# Patient Record
Sex: FEMALE | Race: OTHER | HISPANIC OR LATINO
[De-identification: names, ages, dates, MRNs, and addresses within clinical notes are randomized per-mention and may not be internally consistent; named-entity substitution may affect disease eponyms.]

---

## 2017-08-23 ENCOUNTER — RECORD ABSTRACTING (OUTPATIENT)
Age: 37
End: 2017-08-23

## 2017-08-23 DIAGNOSIS — Z92.89 PERSONAL HISTORY OF OTHER MEDICAL TREATMENT: ICD-10-CM

## 2017-08-28 ENCOUNTER — APPOINTMENT (OUTPATIENT)
Dept: ANTEPARTUM | Facility: CLINIC | Age: 37
End: 2017-08-28
Payer: COMMERCIAL

## 2017-08-28 PROCEDURE — 76830 TRANSVAGINAL US NON-OB: CPT

## 2017-08-28 PROCEDURE — 76857 US EXAM PELVIC LIMITED: CPT

## 2018-08-03 ENCOUNTER — APPOINTMENT (OUTPATIENT)
Dept: OBGYN | Facility: CLINIC | Age: 38
End: 2018-08-03

## 2019-07-12 ENCOUNTER — APPOINTMENT (OUTPATIENT)
Dept: OBGYN | Facility: CLINIC | Age: 39
End: 2019-07-12
Payer: COMMERCIAL

## 2019-07-12 VITALS
DIASTOLIC BLOOD PRESSURE: 86 MMHG | WEIGHT: 162 LBS | HEIGHT: 67 IN | BODY MASS INDEX: 25.43 KG/M2 | SYSTOLIC BLOOD PRESSURE: 117 MMHG

## 2019-07-12 DIAGNOSIS — Z78.9 OTHER SPECIFIED HEALTH STATUS: ICD-10-CM

## 2019-07-12 DIAGNOSIS — Z82.49 FAMILY HISTORY OF ISCHEMIC HEART DISEASE AND OTHER DISEASES OF THE CIRCULATORY SYSTEM: ICD-10-CM

## 2019-07-12 DIAGNOSIS — Z85.850 PERSONAL HISTORY OF MALIGNANT NEOPLASM OF THYROID: ICD-10-CM

## 2019-07-12 DIAGNOSIS — Z80.9 FAMILY HISTORY OF MALIGNANT NEOPLASM, UNSPECIFIED: ICD-10-CM

## 2019-07-12 PROCEDURE — 99214 OFFICE O/P EST MOD 30 MIN: CPT

## 2019-07-12 RX ORDER — VITAMIN C, CALCIUM, IRON, VITAMIN D3, VITAMIN E, THIAMIN, RIBOFLAVIN, NIACINAMIDE, VITAMIN B6, FOLIC ACID, IODINE, ZINC, COPPER, DOCUSATE SODIUM 120; 85; 30; 3; 20; 20; 1; 25; 2; 50; 159; 4.54; 150; 5; 400; 3.4 MG/1; MG/1; [IU]/1; MG/1; MG/1; MG/1; MG/1; MG/1; MG/1; MG/1; MG/1; MG/1; UG/1; MG/1; [IU]/1; MG/1
90-1 & 300 TABLET ORAL
Qty: 90 | Refills: 3 | Status: ACTIVE | COMMUNITY
Start: 2019-07-12 | End: 1900-01-01

## 2019-07-12 RX ORDER — ONDANSETRON 4 MG/1
4 TABLET ORAL
Qty: 42 | Refills: 6 | Status: ACTIVE | COMMUNITY
Start: 2019-07-12 | End: 1900-01-01

## 2019-07-12 NOTE — PHYSICAL EXAM
[Awake] : awake [Alert] : alert [Acute Distress] : no acute distress [Mass] : no breast mass [Nipple Discharge] : no nipple discharge [Axillary LAD] : no axillary lymphadenopathy [Soft] : soft [Tender] : non tender [Oriented x3] : oriented to person, place, and time [Normal] : uterus [No Bleeding] : there was no active vaginal bleeding [Polyp ___ cm] : had a [unfilled] ~Ucm polyp [Enlarged ___ wks] : enlarged [unfilled] ~Uweeks [Uterine Adnexae] : were not tender and not enlarged

## 2019-07-15 LAB
C TRACH RRNA SPEC QL NAA+PROBE: NOT DETECTED
HPV HIGH+LOW RISK DNA PNL CVX: NOT DETECTED
N GONORRHOEA RRNA SPEC QL NAA+PROBE: NOT DETECTED
SOURCE TP AMPLIFICATION: NORMAL

## 2019-07-16 LAB — CYTOLOGY CVX/VAG DOC THIN PREP: NORMAL

## 2019-07-22 ENCOUNTER — LABORATORY RESULT (OUTPATIENT)
Age: 39
End: 2019-07-22

## 2019-07-23 ENCOUNTER — NON-APPOINTMENT (OUTPATIENT)
Age: 39
End: 2019-07-23

## 2019-07-23 ENCOUNTER — APPOINTMENT (OUTPATIENT)
Dept: OBGYN | Facility: CLINIC | Age: 39
End: 2019-07-23
Payer: COMMERCIAL

## 2019-07-23 DIAGNOSIS — Z87.01 PERSONAL HISTORY OF PNEUMONIA (RECURRENT): ICD-10-CM

## 2019-07-23 DIAGNOSIS — Z83.3 FAMILY HISTORY OF DIABETES MELLITUS: ICD-10-CM

## 2019-07-23 DIAGNOSIS — Z82.49 FAMILY HISTORY OF ISCHEMIC HEART DISEASE AND OTHER DISEASES OF THE CIRCULATORY SYSTEM: ICD-10-CM

## 2019-07-23 DIAGNOSIS — M51.26 OTHER INTERVERTEBRAL DISC DISPLACEMENT, LUMBAR REGION: ICD-10-CM

## 2019-07-23 PROCEDURE — 0501F PRENATAL FLOW SHEET: CPT

## 2019-07-23 RX ORDER — LEVOTHYROXINE SODIUM 0.17 MG/1
175 TABLET ORAL
Qty: 90 | Refills: 0 | Status: COMPLETED | COMMUNITY
Start: 2019-05-10

## 2019-07-25 LAB
ABO + RH PNL BLD: NORMAL
APPEARANCE: ABNORMAL
BASOPHILS # BLD AUTO: 0.04 K/UL
BASOPHILS NFR BLD AUTO: 0.6 %
BILIRUBIN URINE: NEGATIVE
BLD GP AB SCN SERPL QL: NORMAL
BLOOD URINE: NEGATIVE
CMV IGG SERPL QL: 2.5 U/ML
CMV IGG SERPL-IMP: POSITIVE
COLOR: YELLOW
EOSINOPHIL # BLD AUTO: 0.12 K/UL
EOSINOPHIL NFR BLD AUTO: 1.8 %
ESTIMATED AVERAGE GLUCOSE: 105 MG/DL
GLUCOSE QUALITATIVE U: NEGATIVE
HBA1C MFR BLD HPLC: 5.3 %
HBV SURFACE AG SER QL: NONREACTIVE
HCT VFR BLD CALC: 42.5 %
HCV AB SER QL: NONREACTIVE
HCV S/CO RATIO: 0.14 S/CO
HGB BLD-MCNC: 12.9 G/DL
HIV1+2 AB SPEC QL IA.RAPID: NONREACTIVE
IMM GRANULOCYTES NFR BLD AUTO: 0.1 %
KETONES URINE: NEGATIVE
LEUKOCYTE ESTERASE URINE: NEGATIVE
LYMPHOCYTES # BLD AUTO: 2.07 K/UL
LYMPHOCYTES NFR BLD AUTO: 31 %
M TB IFN-G BLD-IMP: NEGATIVE
MAN DIFF?: NORMAL
MCHC RBC-ENTMCNC: 27.7 PG
MCHC RBC-ENTMCNC: 30.4 GM/DL
MCV RBC AUTO: 91.4 FL
MEV IGG FLD QL IA: <5 AU/ML
MEV IGG+IGM SER-IMP: NEGATIVE
MONOCYTES # BLD AUTO: 0.4 K/UL
MONOCYTES NFR BLD AUTO: 6 %
MUV AB SER-ACNC: POSITIVE
MUV IGG SER QL IA: 118 AU/ML
NEUTROPHILS # BLD AUTO: 4.04 K/UL
NEUTROPHILS NFR BLD AUTO: 60.5 %
NITRITE URINE: NEGATIVE
PH URINE: 7.5
PLATELET # BLD AUTO: 220 K/UL
PROTEIN URINE: ABNORMAL
QUANTIFERON TB PLUS MITOGEN MINUS NIL: 8.68 IU/ML
QUANTIFERON TB PLUS NIL: 0.03 IU/ML
QUANTIFERON TB PLUS TB1 MINUS NIL: -0.01 IU/ML
QUANTIFERON TB PLUS TB2 MINUS NIL: -0.01 IU/ML
RBC # BLD: 4.65 M/UL
RBC # FLD: 15.4 %
RUBV IGG FLD-ACNC: 4.8 INDEX
RUBV IGG SER-IMP: POSITIVE
SPECIFIC GRAVITY URINE: 1.02
T GONDII AB SER-IMP: POSITIVE
T GONDII IGG SER QL: 26.9 IU/ML
T PALLIDUM AB SER QL IA: NEGATIVE
TSH SERPL-ACNC: 45.8 UIU/ML
UROBILINOGEN URINE: NORMAL
VZV AB TITR SER: POSITIVE
VZV IGG SER IF-ACNC: 947.5 INDEX
WBC # FLD AUTO: 6.68 K/UL

## 2019-07-26 LAB
B19V IGG SER QL IA: 4.4 INDEX
B19V IGG+IGM SER-IMP: NORMAL
B19V IGG+IGM SER-IMP: POSITIVE
B19V IGM FLD-ACNC: 0.2
B19V IGM SER-ACNC: NEGATIVE
HGB A MFR BLD: 97.4 %
HGB A2 MFR BLD: 2.6 %
HGB FRACT BLD-IMP: NORMAL

## 2019-08-07 ENCOUNTER — APPOINTMENT (OUTPATIENT)
Dept: MATERNAL FETAL MEDICINE | Facility: CLINIC | Age: 39
End: 2019-08-07
Payer: MEDICAID

## 2019-08-07 ENCOUNTER — ASOB RESULT (OUTPATIENT)
Age: 39
End: 2019-08-07

## 2019-08-07 ENCOUNTER — APPOINTMENT (OUTPATIENT)
Dept: ANTEPARTUM | Facility: CLINIC | Age: 39
End: 2019-08-07
Payer: MEDICAID

## 2019-08-07 PROCEDURE — 76813 OB US NUCHAL MEAS 1 GEST: CPT

## 2019-08-07 PROCEDURE — 99213 OFFICE O/P EST LOW 20 MIN: CPT | Mod: 25

## 2019-08-07 PROCEDURE — 36416 COLLJ CAPILLARY BLOOD SPEC: CPT

## 2019-08-12 LAB
1ST TRIMESTER DATA: NORMAL
ADDENDUM DOC: NORMAL
AFP PNL SERPL: NORMAL
AFP SERPL-ACNC: NORMAL
CLINICAL BIOCHEMIST REVIEW: ABNORMAL
FREE BETA HCG 1ST TRIMESTER: NORMAL
Lab: NORMAL
NASAL BONE: PRESENT
NOTES NTD: NORMAL
NT: NORMAL
PAPP-A SERPL-ACNC: NORMAL
TRISOMY 18/3: NORMAL

## 2019-08-14 ENCOUNTER — NON-APPOINTMENT (OUTPATIENT)
Age: 39
End: 2019-08-14

## 2019-08-14 ENCOUNTER — APPOINTMENT (OUTPATIENT)
Dept: OBGYN | Facility: CLINIC | Age: 39
End: 2019-08-14
Payer: MEDICAID

## 2019-08-14 VITALS
BODY MASS INDEX: 25.74 KG/M2 | DIASTOLIC BLOOD PRESSURE: 69 MMHG | SYSTOLIC BLOOD PRESSURE: 106 MMHG | HEART RATE: 80 BPM | HEIGHT: 67 IN | WEIGHT: 164 LBS

## 2019-08-14 PROCEDURE — 99213 OFFICE O/P EST LOW 20 MIN: CPT

## 2019-08-14 RX ORDER — ONDANSETRON 4 MG/1
4 TABLET ORAL
Qty: 28 | Refills: 2 | Status: ACTIVE | COMMUNITY
Start: 2019-08-14 | End: 1900-01-01

## 2019-08-21 ENCOUNTER — APPOINTMENT (OUTPATIENT)
Dept: ANTEPARTUM | Facility: CLINIC | Age: 39
End: 2019-08-21

## 2019-08-21 ENCOUNTER — APPOINTMENT (OUTPATIENT)
Dept: MATERNAL FETAL MEDICINE | Facility: CLINIC | Age: 39
End: 2019-08-21
Payer: MEDICAID

## 2019-08-21 ENCOUNTER — ASOB RESULT (OUTPATIENT)
Age: 39
End: 2019-08-21

## 2019-08-21 PROCEDURE — 99213 OFFICE O/P EST LOW 20 MIN: CPT

## 2019-08-26 ENCOUNTER — APPOINTMENT (OUTPATIENT)
Dept: ANTEPARTUM | Facility: CLINIC | Age: 39
End: 2019-08-26

## 2019-08-26 LAB
1ST TRIMESTER DATA: NORMAL
2ND TRIMESTER DATA: NORMAL
AFP PNL SERPL: NORMAL
AFP SERPL-ACNC: NORMAL
AFP SERPL-ACNC: NORMAL
B-HCG FREE SERPL-MCNC: NORMAL
CLINICAL BIOCHEMIST REVIEW: ABNORMAL
CLINICAL BIOCHEMIST REVIEW: NORMAL
CLINICAL BIOCHEMIST REVIEW: NORMAL
FREE BETA HCG 1ST TRIMESTER: NORMAL
INHIBIN A SERPL-MCNC: NORMAL
Lab: NORMAL
NASAL BONE: PRESENT
NOTES NTD: NORMAL
NT: NORMAL
PAPP-A SERPL-ACNC: NORMAL
U ESTRIOL SERPL-SCNC: NORMAL

## 2019-09-03 ENCOUNTER — APPOINTMENT (OUTPATIENT)
Dept: OBGYN | Facility: CLINIC | Age: 39
End: 2019-09-03
Payer: MEDICAID

## 2019-09-03 ENCOUNTER — NON-APPOINTMENT (OUTPATIENT)
Age: 39
End: 2019-09-03

## 2019-09-03 VITALS
BODY MASS INDEX: 26.53 KG/M2 | WEIGHT: 169.06 LBS | HEART RATE: 80 BPM | SYSTOLIC BLOOD PRESSURE: 113 MMHG | DIASTOLIC BLOOD PRESSURE: 75 MMHG | HEIGHT: 67 IN

## 2019-09-03 PROCEDURE — 0502F SUBSEQUENT PRENATAL CARE: CPT | Mod: NC

## 2019-09-03 RX ORDER — PRENATAL VIT NO.130/IRON/FOLIC 27MG-0.8MG
28-0.8 TABLET ORAL DAILY
Qty: 90 | Refills: 2 | Status: ACTIVE | COMMUNITY
Start: 2019-09-03 | End: 1900-01-01

## 2019-09-10 ENCOUNTER — APPOINTMENT (OUTPATIENT)
Dept: ANTEPARTUM | Facility: CLINIC | Age: 39
End: 2019-09-10

## 2019-09-16 LAB — FMR1 GENE MUT ANL BLD/T: NORMAL

## 2019-09-19 LAB
AR GENE MUT ANL BLD/T: NEGATIVE
CFTR MUT TESTED BLD/T: NEGATIVE

## 2019-09-23 ENCOUNTER — NON-APPOINTMENT (OUTPATIENT)
Age: 39
End: 2019-09-23

## 2019-09-23 ENCOUNTER — APPOINTMENT (OUTPATIENT)
Dept: OBGYN | Facility: CLINIC | Age: 39
End: 2019-09-23
Payer: MEDICAID

## 2019-09-23 ENCOUNTER — APPOINTMENT (OUTPATIENT)
Dept: ANTEPARTUM | Facility: CLINIC | Age: 39
End: 2019-09-23
Payer: MEDICAID

## 2019-09-23 ENCOUNTER — ASOB RESULT (OUTPATIENT)
Age: 39
End: 2019-09-23

## 2019-09-23 VITALS
SYSTOLIC BLOOD PRESSURE: 107 MMHG | DIASTOLIC BLOOD PRESSURE: 69 MMHG | BODY MASS INDEX: 26.45 KG/M2 | HEIGHT: 67 IN | WEIGHT: 168.5 LBS

## 2019-09-23 PROCEDURE — 0502F SUBSEQUENT PRENATAL CARE: CPT | Mod: NC

## 2019-09-23 PROCEDURE — 76811 OB US DETAILED SNGL FETUS: CPT

## 2019-09-23 PROCEDURE — 76817 TRANSVAGINAL US OBSTETRIC: CPT

## 2019-10-02 ENCOUNTER — APPOINTMENT (OUTPATIENT)
Dept: ANTEPARTUM | Facility: CLINIC | Age: 39
End: 2019-10-02

## 2019-10-16 ENCOUNTER — APPOINTMENT (OUTPATIENT)
Dept: OBGYN | Facility: CLINIC | Age: 39
End: 2019-10-16
Payer: MEDICAID

## 2019-10-16 ENCOUNTER — NON-APPOINTMENT (OUTPATIENT)
Age: 39
End: 2019-10-16

## 2019-10-16 VITALS
SYSTOLIC BLOOD PRESSURE: 117 MMHG | HEART RATE: 90 BPM | BODY MASS INDEX: 27.47 KG/M2 | DIASTOLIC BLOOD PRESSURE: 73 MMHG | HEIGHT: 67 IN | WEIGHT: 175 LBS

## 2019-10-16 PROCEDURE — 0502F SUBSEQUENT PRENATAL CARE: CPT

## 2019-11-11 ENCOUNTER — NON-APPOINTMENT (OUTPATIENT)
Age: 39
End: 2019-11-11

## 2019-11-11 ENCOUNTER — APPOINTMENT (OUTPATIENT)
Dept: OBGYN | Facility: CLINIC | Age: 39
End: 2019-11-11
Payer: MEDICAID

## 2019-11-11 VITALS
DIASTOLIC BLOOD PRESSURE: 82 MMHG | BODY MASS INDEX: 27.15 KG/M2 | WEIGHT: 173 LBS | HEIGHT: 67 IN | HEART RATE: 102 BPM | SYSTOLIC BLOOD PRESSURE: 121 MMHG

## 2019-11-11 PROCEDURE — 0502F SUBSEQUENT PRENATAL CARE: CPT

## 2019-11-12 LAB
BILIRUB UR QL STRIP: NORMAL
GLUCOSE UR-MCNC: 250
HCG UR QL: 0.2 EU/DL
HGB UR QL STRIP.AUTO: NORMAL
KETONES UR-MCNC: NORMAL
LEUKOCYTE ESTERASE UR QL STRIP: NORMAL
NITRITE UR QL STRIP: NORMAL
PH UR STRIP: 5.5
PROT UR STRIP-MCNC: 30
SP GR UR STRIP: 1.03

## 2019-11-18 ENCOUNTER — LABORATORY RESULT (OUTPATIENT)
Age: 39
End: 2019-11-18

## 2019-11-18 ENCOUNTER — APPOINTMENT (OUTPATIENT)
Dept: OBGYN | Facility: CLINIC | Age: 39
End: 2019-11-18
Payer: MEDICAID

## 2019-11-18 PROCEDURE — 36415 COLL VENOUS BLD VENIPUNCTURE: CPT

## 2019-11-19 LAB
BASOPHILS # BLD AUTO: 0.03 K/UL
BASOPHILS NFR BLD AUTO: 0.3 %
EOSINOPHIL # BLD AUTO: 0.08 K/UL
EOSINOPHIL NFR BLD AUTO: 0.8 %
HCT VFR BLD CALC: 36.9 %
HGB BLD-MCNC: 11.5 G/DL
IMM GRANULOCYTES NFR BLD AUTO: 0.4 %
LYMPHOCYTES # BLD AUTO: 1.88 K/UL
LYMPHOCYTES NFR BLD AUTO: 19.9 %
MAN DIFF?: NORMAL
MCHC RBC-ENTMCNC: 27.3 PG
MCHC RBC-ENTMCNC: 31.2 GM/DL
MCV RBC AUTO: 87.6 FL
MONOCYTES # BLD AUTO: 0.56 K/UL
MONOCYTES NFR BLD AUTO: 5.9 %
NEUTROPHILS # BLD AUTO: 6.87 K/UL
NEUTROPHILS NFR BLD AUTO: 72.7 %
PLATELET # BLD AUTO: 246 K/UL
RBC # BLD: 4.21 M/UL
RBC # FLD: 14 %
WBC # FLD AUTO: 9.46 K/UL

## 2019-11-25 ENCOUNTER — ASOB RESULT (OUTPATIENT)
Age: 39
End: 2019-11-25

## 2019-11-25 ENCOUNTER — APPOINTMENT (OUTPATIENT)
Dept: ANTEPARTUM | Facility: CLINIC | Age: 39
End: 2019-11-25
Payer: MEDICAID

## 2019-11-25 PROCEDURE — 76816 OB US FOLLOW-UP PER FETUS: CPT

## 2019-11-26 ENCOUNTER — RESULT REVIEW (OUTPATIENT)
Age: 39
End: 2019-11-26

## 2019-11-26 DIAGNOSIS — N91.1 SECONDARY AMENORRHEA: ICD-10-CM

## 2019-12-03 ENCOUNTER — NON-APPOINTMENT (OUTPATIENT)
Age: 39
End: 2019-12-03

## 2019-12-03 ENCOUNTER — APPOINTMENT (OUTPATIENT)
Dept: OBGYN | Facility: CLINIC | Age: 39
End: 2019-12-03
Payer: MEDICAID

## 2019-12-03 VITALS
HEIGHT: 67 IN | SYSTOLIC BLOOD PRESSURE: 115 MMHG | WEIGHT: 178.31 LBS | HEART RATE: 105 BPM | BODY MASS INDEX: 27.99 KG/M2 | DIASTOLIC BLOOD PRESSURE: 72 MMHG

## 2019-12-03 PROCEDURE — 0502F SUBSEQUENT PRENATAL CARE: CPT

## 2019-12-05 LAB
GLUCOSE 1H P 100 G GLC PO SERPL-MCNC: 183 MG/DL
GLUCOSE 1H P 50 G GLC PO SERPL-MCNC: 146 MG/DL
GLUCOSE 2H P CHAL SERPL-MCNC: 149 MG/DL
GLUCOSE 3H P CHAL SERPL-MCNC: 146 MG/DL
GLUCOSE BS SERPL-MCNC: 80 MG/DL

## 2019-12-13 ENCOUNTER — APPOINTMENT (OUTPATIENT)
Dept: MATERNAL FETAL MEDICINE | Facility: CLINIC | Age: 39
End: 2019-12-13
Payer: MEDICAID

## 2019-12-13 ENCOUNTER — ASOB RESULT (OUTPATIENT)
Age: 39
End: 2019-12-13

## 2019-12-13 VITALS — BODY MASS INDEX: 28.18 KG/M2 | HEIGHT: 67 IN | WEIGHT: 179.56 LBS

## 2019-12-13 PROCEDURE — XXXXX: CPT

## 2019-12-13 RX ORDER — BLOOD SUGAR DIAGNOSTIC
STRIP MISCELLANEOUS
Qty: 3 | Refills: 3 | Status: DISCONTINUED | COMMUNITY
Start: 2019-12-13 | End: 2019-12-13

## 2019-12-13 RX ORDER — BLOOD-GLUCOSE METER
W/DEVICE KIT MISCELLANEOUS
Qty: 1 | Refills: 0 | Status: ACTIVE | COMMUNITY
Start: 2019-12-13 | End: 1900-01-01

## 2019-12-13 RX ORDER — LANCETS 28 GAUGE
EACH MISCELLANEOUS
Qty: 1 | Refills: 3 | Status: ACTIVE | COMMUNITY
Start: 2019-12-13 | End: 1900-01-01

## 2019-12-13 RX ORDER — LANCETS
EACH MISCELLANEOUS
Qty: 1 | Refills: 3 | Status: DISCONTINUED | COMMUNITY
Start: 2019-12-13 | End: 2019-12-13

## 2019-12-13 RX ORDER — BLOOD SUGAR DIAGNOSTIC
STRIP MISCELLANEOUS 4 TIMES DAILY
Qty: 3 | Refills: 3 | Status: ACTIVE | COMMUNITY
Start: 2019-12-13 | End: 1900-01-01

## 2019-12-16 LAB
ESTIMATED AVERAGE GLUCOSE: 114 MG/DL
HBA1C MFR BLD HPLC: 5.6 %

## 2019-12-17 ENCOUNTER — APPOINTMENT (OUTPATIENT)
Dept: OBGYN | Facility: CLINIC | Age: 39
End: 2019-12-17
Payer: MEDICAID

## 2019-12-17 ENCOUNTER — NON-APPOINTMENT (OUTPATIENT)
Age: 39
End: 2019-12-17

## 2019-12-17 VITALS
DIASTOLIC BLOOD PRESSURE: 75 MMHG | SYSTOLIC BLOOD PRESSURE: 113 MMHG | BODY MASS INDEX: 27.61 KG/M2 | WEIGHT: 176.31 LBS | HEART RATE: 86 BPM

## 2019-12-17 PROCEDURE — 90715 TDAP VACCINE 7 YRS/> IM: CPT

## 2019-12-17 PROCEDURE — 0502F SUBSEQUENT PRENATAL CARE: CPT

## 2019-12-17 PROCEDURE — 90471 IMMUNIZATION ADMIN: CPT

## 2019-12-23 ENCOUNTER — APPOINTMENT (OUTPATIENT)
Dept: ANTEPARTUM | Facility: CLINIC | Age: 39
End: 2019-12-23
Payer: MEDICAID

## 2019-12-23 ENCOUNTER — ASOB RESULT (OUTPATIENT)
Age: 39
End: 2019-12-23

## 2019-12-23 PROCEDURE — 76819 FETAL BIOPHYS PROFIL W/O NST: CPT

## 2019-12-23 PROCEDURE — 76816 OB US FOLLOW-UP PER FETUS: CPT

## 2019-12-27 ENCOUNTER — APPOINTMENT (OUTPATIENT)
Dept: ANTEPARTUM | Facility: CLINIC | Age: 39
End: 2019-12-27

## 2019-12-27 ENCOUNTER — APPOINTMENT (OUTPATIENT)
Dept: MATERNAL FETAL MEDICINE | Facility: CLINIC | Age: 39
End: 2019-12-27
Payer: MEDICAID

## 2019-12-27 VITALS
OXYGEN SATURATION: 98 % | DIASTOLIC BLOOD PRESSURE: 70 MMHG | WEIGHT: 182.56 LBS | SYSTOLIC BLOOD PRESSURE: 115 MMHG | BODY MASS INDEX: 28.65 KG/M2 | HEIGHT: 67 IN | HEART RATE: 98 BPM | RESPIRATION RATE: 18 BRPM

## 2019-12-27 DIAGNOSIS — O09.513 SUPERVISION OF ELDERLY PRIMIGRAVIDA, THIRD TRIMESTER: ICD-10-CM

## 2019-12-27 PROCEDURE — 99214 OFFICE O/P EST MOD 30 MIN: CPT

## 2019-12-27 RX ORDER — NITROFURANTOIN (MONOHYDRATE/MACROCRYSTALS) 25; 75 MG/1; MG/1
100 CAPSULE ORAL
Qty: 14 | Refills: 0 | Status: DISCONTINUED | COMMUNITY
Start: 2019-11-21

## 2019-12-27 RX ORDER — TERCONAZOLE 4 MG/G
0.4 CREAM VAGINAL DAILY
Qty: 1 | Refills: 3 | Status: DISCONTINUED | COMMUNITY
Start: 2019-12-17 | End: 2019-12-27

## 2019-12-27 RX ORDER — BLOOD-GLUCOSE METER
KIT MISCELLANEOUS
Qty: 1 | Refills: 0 | Status: ACTIVE | COMMUNITY
Start: 2019-12-13

## 2019-12-27 RX ORDER — LEVOTHYROXINE SODIUM 0.12 MG/1
125 TABLET ORAL
Qty: 60 | Refills: 0 | Status: ACTIVE | COMMUNITY
Start: 2019-12-11

## 2019-12-27 RX ORDER — LEVOTHYROXINE SODIUM 0.2 MG/1
200 TABLET ORAL
Refills: 0 | Status: DISCONTINUED | COMMUNITY
End: 2019-12-27

## 2019-12-27 RX ORDER — POLYETHYLENE GLYCOL 3350 17 G/17G
17 POWDER, FOR SOLUTION ORAL
Qty: 510 | Refills: 0 | Status: DISCONTINUED | COMMUNITY
Start: 2019-11-30

## 2019-12-27 NOTE — ACTIVE PROBLEMS
[Heart Disease] : no heart disease [Hypertension] : no hypertension [Neurologic Disorder] : no neurologic disorder, no epilepsy [Renal Disease] : no kidney disease, no UTI [Autoimmune Disease] : no autoimmune disease [Hepatic Disorder] : no hepatitis, no liver disease [Psychiatric Disorders] : no psychiatric disorders [Depression] : no depression, no post partum depression [Trauma] : no trauma/violence [Thrombophlebitis] : no varicosities, no phlebitis [Blood Transfusion (___ Ml)] : no history of blood transfusion

## 2019-12-27 NOTE — PAST MEDICAL HISTORY
[HIV Infection] : no HIV [Exposure To Gonorrhea] : no gonorrhea [Chlamydial Infections] : no chlamydia [Syphilis] : no syphilis [Herpes Simplex] : no genital herpes [Hepatitis, B Virus] : no Hepatitis B [Human Papilloma Virus Infection] : no genital warts [Trichomoniasis] : no trichomoniasis [Hepatitis, C Virus] : no Hepatitis C

## 2019-12-27 NOTE — VITALS
[US Date: ___] : ultrasound performed on [unfilled]. [GA= ___ Days] : and [unfilled] day(s) [GA= ___ Weeks] : Results were GA of [unfilled] weeks [By US] : this is the final DIANNE [DIANNE by US (date): ___] : The calculated DIANNE by US is [unfilled]

## 2019-12-27 NOTE — DISCUSSION/SUMMARY
[FreeTextEntry1] : She is 33 weeks and 6 days gestation by early ultrasound dating.\par \par Regarding her gestational diabetes, a Hemoglobin A1c done on 2019 was 5.6%.  She states that she has been trying to follow a diabetic diet. She is not able to eat 3 meals and 3 snacks. 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 She eats a snack at 4 AM at work. She checks her glucose when she gets home from work and 2 other times during the day. None of the glucose readings are fasting or 1 hour after meals. She is not keeping a food diary. She is not dating her glucose entries. She was advised to start keeping a food diary and dating her glucose entries. My review of her log book revealed 2 glucose readings above 140 (145 &151). At this time it is difficult to asses her glycemic control. I informed her that maintaining euglycemia is the most important factor associated with good  outcomes in pregnancies complicated by gestational diabetes. I told her that poor glucose control may cause fetal macrosomia, shoulder dystocia,  delivery,  respiratory distress syndrome and  metabolic complications such as hypoglycemia and hyperbilirubinemia.  I told her that she is at risk for developing gestational hypertension and preeclampsia during the current pregnancy. I also told her that she is at risk for developing type 2 diabetes, metabolic syndrome and cardiovascular disease later in life.  I also recommend a 75 gram 2 hour OGTT approximately 6 - 8 weeks postpartum to determine whether she has impaired glucose tolerance or preexisting diabetes not diagnosed prior to the pregnancy. I believe that she needs more nutritional counseling. I made arrangements for her to see our diabetes educator on 1/3/20. \par \par She had a prior  section delivery in [place]. The type of uterine incision is unknown. . She was informed of the risks and benefits of a trial of labor. She was informed of the risk of uterine rupture and the associated maternal complications such as hysterectomy, blood transfusions, and intensive care unit admission. She was also informed of the associated  adverse outcomes in cases of uterine rupture such as stillbirth, fetal hypoxia and neurological impairment (cerebral palsy). She was advised to have a repeat  delivery due to unknown uterine scar.\par \par

## 2019-12-27 NOTE — FAMILY HISTORY
[Roberto-Sachs Carrier] : no Roberto-Sachs [Reported Family History Of Birth Defects] : no congenital heart defects [Family History] : no mental retardation/autism [Reported Family History Of Genetic Disease] : no history of child defect in child of baby father

## 2019-12-27 NOTE — SURGICAL HISTORY
[Infertility] : infertility [Last Pap: ___] : Last Pap: [unfilled] [Fibroids] : no fibroids [Abn Paps] : no abnormal pap smears [Breast Disease] : no breast disease [STI's] : no STI's [Cysts] : no cysts [OC Use] : no OC use

## 2019-12-27 NOTE — OB HISTORY
[Pregnancy History] : patient received anesthesia [Spontaneous] : Spontaneous conception [at ___ wks] : at [unfilled] weeks [Sonogram] : sonogram [Approximately ___ (Month)] : the LMP was approximately [unfilled] month(s) ago [___] : Delivery occurred at [unfilled] [FreeTextEntry1] : A three-hour GTT done on December 4, 2019 reported a fasting glucose of 80, one-hour glucose of 183, two-hour glucose of 149, and three-hour glucose of 146. There were 2 elevated glucose values consistent with the diagnosis of gestational diabetes.\par \par She was seen by our diabetes educator on December 13, 2019 for initial diabetes education and counseling. \par \par  [Normal Amount/Duration] : was abnormal [Spotting Between  Menses] : no spotting between menses [Regular Cycle Intervals] : periods have been irregular

## 2020-01-03 ENCOUNTER — APPOINTMENT (OUTPATIENT)
Dept: MATERNAL FETAL MEDICINE | Facility: CLINIC | Age: 40
End: 2020-01-03
Payer: MEDICAID

## 2020-01-03 ENCOUNTER — APPOINTMENT (OUTPATIENT)
Dept: OBGYN | Facility: CLINIC | Age: 40
End: 2020-01-03
Payer: MEDICAID

## 2020-01-03 ENCOUNTER — NON-APPOINTMENT (OUTPATIENT)
Age: 40
End: 2020-01-03

## 2020-01-03 ENCOUNTER — ASOB RESULT (OUTPATIENT)
Age: 40
End: 2020-01-03

## 2020-01-03 VITALS
HEART RATE: 81 BPM | SYSTOLIC BLOOD PRESSURE: 108 MMHG | DIASTOLIC BLOOD PRESSURE: 73 MMHG | WEIGHT: 181.31 LBS | BODY MASS INDEX: 28.4 KG/M2

## 2020-01-03 VITALS — HEIGHT: 67 IN | WEIGHT: 182 LBS | BODY MASS INDEX: 28.56 KG/M2

## 2020-01-03 PROCEDURE — G0108 DIAB MANAGE TRN  PER INDIV: CPT

## 2020-01-03 PROCEDURE — 0502F SUBSEQUENT PRENATAL CARE: CPT

## 2020-01-06 DIAGNOSIS — Z3A.34 34 WEEKS GESTATION OF PREGNANCY: ICD-10-CM

## 2020-01-06 DIAGNOSIS — Z3A.33 33 WEEKS GESTATION OF PREGNANCY: ICD-10-CM

## 2020-01-16 ENCOUNTER — NON-APPOINTMENT (OUTPATIENT)
Age: 40
End: 2020-01-16

## 2020-01-16 ENCOUNTER — APPOINTMENT (OUTPATIENT)
Dept: OBGYN | Facility: CLINIC | Age: 40
End: 2020-01-16
Payer: MEDICAID

## 2020-01-16 VITALS
BODY MASS INDEX: 28.74 KG/M2 | SYSTOLIC BLOOD PRESSURE: 125 MMHG | WEIGHT: 183.5 LBS | DIASTOLIC BLOOD PRESSURE: 81 MMHG | HEART RATE: 88 BPM

## 2020-01-16 DIAGNOSIS — N60.19 DIFFUSE CYSTIC MASTOPATHY OF UNSPECIFIED BREAST: ICD-10-CM

## 2020-01-16 PROCEDURE — 0502F SUBSEQUENT PRENATAL CARE: CPT

## 2020-01-16 RX ORDER — TERCONAZOLE 4 MG/G
0.4 CREAM VAGINAL DAILY
Qty: 1 | Refills: 3 | Status: ACTIVE | COMMUNITY
Start: 2020-01-16 | End: 1900-01-01

## 2020-01-17 LAB — HIV1+2 AB SPEC QL IA.RAPID: NONREACTIVE

## 2020-01-20 ENCOUNTER — ASOB RESULT (OUTPATIENT)
Age: 40
End: 2020-01-20

## 2020-01-20 ENCOUNTER — APPOINTMENT (OUTPATIENT)
Dept: ANTEPARTUM | Facility: CLINIC | Age: 40
End: 2020-01-20
Payer: MEDICAID

## 2020-01-20 ENCOUNTER — OUTPATIENT (OUTPATIENT)
Dept: OUTPATIENT SERVICES | Facility: HOSPITAL | Age: 40
LOS: 1 days | End: 2020-01-20
Payer: MEDICAID

## 2020-01-20 VITALS
HEART RATE: 76 BPM | TEMPERATURE: 98 F | SYSTOLIC BLOOD PRESSURE: 98 MMHG | WEIGHT: 163.14 LBS | HEIGHT: 67 IN | RESPIRATION RATE: 20 BRPM | DIASTOLIC BLOOD PRESSURE: 68 MMHG

## 2020-01-20 DIAGNOSIS — Z98.89 OTHER SPECIFIED POSTPROCEDURAL STATES: Chronic | ICD-10-CM

## 2020-01-20 DIAGNOSIS — E89.0 POSTPROCEDURAL HYPOTHYROIDISM: Chronic | ICD-10-CM

## 2020-01-20 DIAGNOSIS — Z01.818 ENCOUNTER FOR OTHER PREPROCEDURAL EXAMINATION: ICD-10-CM

## 2020-01-20 LAB
ANION GAP SERPL CALC-SCNC: 12 MMOL/L — SIGNIFICANT CHANGE UP (ref 5–17)
APPEARANCE UR: ABNORMAL
BACTERIA # UR AUTO: ABNORMAL
BASOPHILS # BLD AUTO: 0.03 K/UL — SIGNIFICANT CHANGE UP (ref 0–0.2)
BASOPHILS NFR BLD AUTO: 0.4 % — SIGNIFICANT CHANGE UP (ref 0–2)
BILIRUB UR-MCNC: NEGATIVE — SIGNIFICANT CHANGE UP
BLD GP AB SCN SERPL QL: SIGNIFICANT CHANGE UP
BUN SERPL-MCNC: 13 MG/DL — SIGNIFICANT CHANGE UP (ref 8–20)
CALCIUM SERPL-MCNC: 9.3 MG/DL — SIGNIFICANT CHANGE UP (ref 8.6–10.2)
CHLORIDE SERPL-SCNC: 106 MMOL/L — SIGNIFICANT CHANGE UP (ref 98–107)
CO2 SERPL-SCNC: 22 MMOL/L — SIGNIFICANT CHANGE UP (ref 22–29)
COLOR SPEC: YELLOW — SIGNIFICANT CHANGE UP
CREAT SERPL-MCNC: 0.52 MG/DL — SIGNIFICANT CHANGE UP (ref 0.5–1.3)
DIFF PNL FLD: ABNORMAL
EOSINOPHIL # BLD AUTO: 0.06 K/UL — SIGNIFICANT CHANGE UP (ref 0–0.5)
EOSINOPHIL NFR BLD AUTO: 0.8 % — SIGNIFICANT CHANGE UP (ref 0–6)
EPI CELLS # UR: ABNORMAL
GLUCOSE SERPL-MCNC: 71 MG/DL — SIGNIFICANT CHANGE UP (ref 70–115)
GLUCOSE UR QL: NEGATIVE MG/DL — SIGNIFICANT CHANGE UP
GP B STREP DNA SPEC QL NAA+PROBE: NORMAL
GP B STREP DNA SPEC QL NAA+PROBE: NOT DETECTED
HCT VFR BLD CALC: 38.8 % — SIGNIFICANT CHANGE UP (ref 34.5–45)
HGB BLD-MCNC: 11.8 G/DL — SIGNIFICANT CHANGE UP (ref 11.5–15.5)
HIV 1 & 2 AB SERPL IA.RAPID: SIGNIFICANT CHANGE UP
IMM GRANULOCYTES NFR BLD AUTO: 0.3 % — SIGNIFICANT CHANGE UP (ref 0–1.5)
KETONES UR-MCNC: NEGATIVE — SIGNIFICANT CHANGE UP
LEUKOCYTE ESTERASE UR-ACNC: ABNORMAL
LYMPHOCYTES # BLD AUTO: 1.59 K/UL — SIGNIFICANT CHANGE UP (ref 1–3.3)
LYMPHOCYTES # BLD AUTO: 20.8 % — SIGNIFICANT CHANGE UP (ref 13–44)
MCHC RBC-ENTMCNC: 25.1 PG — LOW (ref 27–34)
MCHC RBC-ENTMCNC: 30.4 GM/DL — LOW (ref 32–36)
MCV RBC AUTO: 82.6 FL — SIGNIFICANT CHANGE UP (ref 80–100)
MONOCYTES # BLD AUTO: 0.46 K/UL — SIGNIFICANT CHANGE UP (ref 0–0.9)
MONOCYTES NFR BLD AUTO: 6 % — SIGNIFICANT CHANGE UP (ref 2–14)
NEUTROPHILS # BLD AUTO: 5.48 K/UL — SIGNIFICANT CHANGE UP (ref 1.8–7.4)
NEUTROPHILS NFR BLD AUTO: 71.7 % — SIGNIFICANT CHANGE UP (ref 43–77)
NITRITE UR-MCNC: NEGATIVE — SIGNIFICANT CHANGE UP
PH UR: 7 — SIGNIFICANT CHANGE UP (ref 5–8)
PLATELET # BLD AUTO: 200 K/UL — SIGNIFICANT CHANGE UP (ref 150–400)
POTASSIUM SERPL-MCNC: 4.5 MMOL/L — SIGNIFICANT CHANGE UP (ref 3.5–5.3)
POTASSIUM SERPL-SCNC: 4.5 MMOL/L — SIGNIFICANT CHANGE UP (ref 3.5–5.3)
PROT UR-MCNC: 15 MG/DL
RBC # BLD: 4.7 M/UL — SIGNIFICANT CHANGE UP (ref 3.8–5.2)
RBC # FLD: 16.6 % — HIGH (ref 10.3–14.5)
RBC CASTS # UR COMP ASSIST: ABNORMAL /HPF (ref 0–4)
SODIUM SERPL-SCNC: 140 MMOL/L — SIGNIFICANT CHANGE UP (ref 135–145)
SOURCE GBS: NORMAL
SP GR SPEC: 1 — LOW (ref 1.01–1.02)
UROBILINOGEN FLD QL: NEGATIVE MG/DL — SIGNIFICANT CHANGE UP
WBC # BLD: 7.64 K/UL — SIGNIFICANT CHANGE UP (ref 3.8–10.5)
WBC # FLD AUTO: 7.64 K/UL — SIGNIFICANT CHANGE UP (ref 3.8–10.5)
WBC UR QL: >50

## 2020-01-20 PROCEDURE — T1013: CPT

## 2020-01-20 PROCEDURE — 76816 OB US FOLLOW-UP PER FETUS: CPT

## 2020-01-20 PROCEDURE — 76819 FETAL BIOPHYS PROFIL W/O NST: CPT

## 2020-01-20 NOTE — OB PST NOTE - INFANT HOME WITH MOTHER, OB PROFILE
Pt requesting to schedule MFM 1st trimester screening. Referral was not placed at office visit on 5/29. Placed referral and informed pt can call MFM to schedule. Pt also looking for procedure codes for testing, advised she check with MFM for these codes.       yes

## 2020-01-20 NOTE — OB PST NOTE - PMH
Gestational diabetes mellitus (GDM), antepartum, gestational diabetes method of control unspecified    Hemorrhoids, complicated    Hypothyroid

## 2020-01-21 LAB — HIV 1+2 AB+HIV1 P24 AG SERPL QL IA: SIGNIFICANT CHANGE UP

## 2020-01-23 ENCOUNTER — FORM ENCOUNTER (OUTPATIENT)
Age: 40
End: 2020-01-23

## 2020-01-23 ENCOUNTER — NON-APPOINTMENT (OUTPATIENT)
Age: 40
End: 2020-01-23

## 2020-01-23 ENCOUNTER — APPOINTMENT (OUTPATIENT)
Dept: OBGYN | Facility: CLINIC | Age: 40
End: 2020-01-23
Payer: MEDICAID

## 2020-01-23 VITALS
DIASTOLIC BLOOD PRESSURE: 87 MMHG | BODY MASS INDEX: 29.96 KG/M2 | WEIGHT: 191.31 LBS | SYSTOLIC BLOOD PRESSURE: 125 MMHG | HEART RATE: 79 BPM

## 2020-01-23 DIAGNOSIS — Z3A.37 37 WEEKS GESTATION OF PREGNANCY: ICD-10-CM

## 2020-01-23 PROBLEM — K64.8 OTHER HEMORRHOIDS: Chronic | Status: ACTIVE | Noted: 2020-01-20

## 2020-01-23 PROBLEM — O24.419 GESTATIONAL DIABETES MELLITUS IN PREGNANCY, UNSPECIFIED CONTROL: Chronic | Status: ACTIVE | Noted: 2020-01-20

## 2020-01-23 PROCEDURE — 0502F SUBSEQUENT PRENATAL CARE: CPT

## 2020-01-24 ENCOUNTER — OUTPATIENT (OUTPATIENT)
Dept: OUTPATIENT SERVICES | Facility: HOSPITAL | Age: 40
LOS: 1 days | End: 2020-01-24
Payer: MEDICAID

## 2020-01-24 ENCOUNTER — APPOINTMENT (OUTPATIENT)
Dept: ULTRASOUND IMAGING | Facility: CLINIC | Age: 40
End: 2020-01-24
Payer: MEDICAID

## 2020-01-24 DIAGNOSIS — Z98.89 OTHER SPECIFIED POSTPROCEDURAL STATES: Chronic | ICD-10-CM

## 2020-01-24 DIAGNOSIS — E89.0 POSTPROCEDURAL HYPOTHYROIDISM: Chronic | ICD-10-CM

## 2020-01-24 DIAGNOSIS — N60.19 DIFFUSE CYSTIC MASTOPATHY OF UNSPECIFIED BREAST: ICD-10-CM

## 2020-01-24 PROCEDURE — 76641 ULTRASOUND BREAST COMPLETE: CPT

## 2020-01-24 PROCEDURE — 76641 ULTRASOUND BREAST COMPLETE: CPT | Mod: 26,50

## 2020-01-28 ENCOUNTER — TRANSCRIPTION ENCOUNTER (OUTPATIENT)
Age: 40
End: 2020-01-28

## 2020-01-29 ENCOUNTER — TRANSCRIPTION ENCOUNTER (OUTPATIENT)
Age: 40
End: 2020-01-29

## 2020-01-29 ENCOUNTER — ASOB RESULT (OUTPATIENT)
Age: 40
End: 2020-01-29

## 2020-01-29 ENCOUNTER — APPOINTMENT (OUTPATIENT)
Dept: ANTEPARTUM | Facility: CLINIC | Age: 40
End: 2020-01-29
Payer: MEDICAID

## 2020-01-29 ENCOUNTER — APPOINTMENT (OUTPATIENT)
Dept: OBGYN | Facility: CLINIC | Age: 40
End: 2020-01-29
Payer: MEDICAID

## 2020-01-29 ENCOUNTER — INPATIENT (INPATIENT)
Facility: HOSPITAL | Age: 40
LOS: 2 days | Discharge: HOME | End: 2020-02-01
Attending: OBSTETRICS & GYNECOLOGY | Admitting: OBSTETRICS & GYNECOLOGY
Payer: MEDICAID

## 2020-01-29 ENCOUNTER — RESULT REVIEW (OUTPATIENT)
Age: 40
End: 2020-01-29

## 2020-01-29 ENCOUNTER — APPOINTMENT (OUTPATIENT)
Dept: MATERNAL FETAL MEDICINE | Facility: CLINIC | Age: 40
End: 2020-01-29
Payer: MEDICAID

## 2020-01-29 ENCOUNTER — NON-APPOINTMENT (OUTPATIENT)
Age: 40
End: 2020-01-29

## 2020-01-29 VITALS
BODY MASS INDEX: 29.57 KG/M2 | SYSTOLIC BLOOD PRESSURE: 100 MMHG | HEIGHT: 67 IN | WEIGHT: 188.44 LBS | DIASTOLIC BLOOD PRESSURE: 60 MMHG

## 2020-01-29 VITALS — HEIGHT: 67 IN | BODY MASS INDEX: 29.72 KG/M2 | WEIGHT: 189.38 LBS

## 2020-01-29 VITALS
HEART RATE: 94 BPM | HEIGHT: 67 IN | SYSTOLIC BLOOD PRESSURE: 130 MMHG | RESPIRATION RATE: 16 BRPM | WEIGHT: 188.94 LBS | DIASTOLIC BLOOD PRESSURE: 84 MMHG | TEMPERATURE: 98 F

## 2020-01-29 DIAGNOSIS — E89.0 POSTPROCEDURAL HYPOTHYROIDISM: Chronic | ICD-10-CM

## 2020-01-29 DIAGNOSIS — Z3A.38 38 WEEKS GESTATION OF PREGNANCY: ICD-10-CM

## 2020-01-29 DIAGNOSIS — Z98.89 OTHER SPECIFIED POSTPROCEDURAL STATES: Chronic | ICD-10-CM

## 2020-01-29 DIAGNOSIS — O24.419 GESTATIONAL DIABETES MELLITUS IN PREGNANCY, UNSPECIFIED CONTROL: ICD-10-CM

## 2020-01-29 DIAGNOSIS — O34.219 MATERNAL CARE FOR UNSPECIFIED TYPE SCAR FROM PREVIOUS CESAREAN DELIVERY: ICD-10-CM

## 2020-01-29 DIAGNOSIS — E03.9 HYPOTHYROIDISM, UNSPECIFIED: ICD-10-CM

## 2020-01-29 DIAGNOSIS — Z3A.37 37 WEEKS GESTATION OF PREGNANCY: ICD-10-CM

## 2020-01-29 LAB
BILIRUB UR QL STRIP: NORMAL
GLUCOSE BLDC GLUCOMTR-MCNC: 60 MG/DL — LOW (ref 70–99)
GLUCOSE BLDC GLUCOMTR-MCNC: 61 MG/DL — LOW (ref 70–99)
GLUCOSE UR-MCNC: NORMAL
HCG UR QL: 0.2 EU/DL
HGB UR QL STRIP.AUTO: NORMAL
KETONES UR-MCNC: NORMAL
LEUKOCYTE ESTERASE UR QL STRIP: ABNORMAL
NITRITE UR QL STRIP: NORMAL
PH UR STRIP: 7
PROT UR STRIP-MCNC: NORMAL
SP GR UR STRIP: 1.01

## 2020-01-29 PROCEDURE — 0502F SUBSEQUENT PRENATAL CARE: CPT

## 2020-01-29 PROCEDURE — 59514 CESAREAN DELIVERY ONLY: CPT

## 2020-01-29 PROCEDURE — 76821 MIDDLE CEREBRAL ARTERY ECHO: CPT

## 2020-01-29 PROCEDURE — G0108 DIAB MANAGE TRN  PER INDIV: CPT

## 2020-01-29 PROCEDURE — 76818 FETAL BIOPHYS PROFILE W/NST: CPT

## 2020-01-29 PROCEDURE — 93976 VASCULAR STUDY: CPT

## 2020-01-29 PROCEDURE — 58661 LAPAROSCOPY REMOVE ADNEXA: CPT

## 2020-01-29 PROCEDURE — 76820 UMBILICAL ARTERY ECHO: CPT

## 2020-01-29 PROCEDURE — 88302 TISSUE EXAM BY PATHOLOGIST: CPT | Mod: 26

## 2020-01-29 RX ORDER — FAMOTIDINE 10 MG/ML
20 INJECTION INTRAVENOUS ONCE
Refills: 0 | Status: COMPLETED | OUTPATIENT
Start: 2020-01-29 | End: 2020-01-29

## 2020-01-29 RX ORDER — KETOROLAC TROMETHAMINE 30 MG/ML
30 SYRINGE (ML) INJECTION EVERY 6 HOURS
Refills: 0 | Status: DISCONTINUED | OUTPATIENT
Start: 2020-01-29 | End: 2020-01-30

## 2020-01-29 RX ORDER — OXYTOCIN 10 UNIT/ML
333.33 VIAL (ML) INJECTION
Qty: 20 | Refills: 0 | Status: DISCONTINUED | OUTPATIENT
Start: 2020-01-29 | End: 2020-02-01

## 2020-01-29 RX ORDER — OXYCODONE HYDROCHLORIDE 5 MG/1
5 TABLET ORAL
Refills: 0 | Status: DISCONTINUED | OUTPATIENT
Start: 2020-01-29 | End: 2020-02-01

## 2020-01-29 RX ORDER — LANOLIN
1 OINTMENT (GRAM) TOPICAL EVERY 6 HOURS
Refills: 0 | Status: DISCONTINUED | OUTPATIENT
Start: 2020-01-29 | End: 2020-02-01

## 2020-01-29 RX ORDER — MAGNESIUM HYDROXIDE 400 MG/1
30 TABLET, CHEWABLE ORAL
Refills: 0 | Status: DISCONTINUED | OUTPATIENT
Start: 2020-01-29 | End: 2020-02-01

## 2020-01-29 RX ORDER — GLYCERIN ADULT
1 SUPPOSITORY, RECTAL RECTAL AT BEDTIME
Refills: 0 | Status: DISCONTINUED | OUTPATIENT
Start: 2020-01-29 | End: 2020-02-01

## 2020-01-29 RX ORDER — CITRIC ACID/SODIUM CITRATE 300-500 MG
30 SOLUTION, ORAL ORAL ONCE
Refills: 0 | Status: COMPLETED | OUTPATIENT
Start: 2020-01-29 | End: 2020-01-29

## 2020-01-29 RX ORDER — SODIUM CHLORIDE 9 MG/ML
1000 INJECTION, SOLUTION INTRAVENOUS
Refills: 0 | Status: DISCONTINUED | OUTPATIENT
Start: 2020-01-29 | End: 2020-01-30

## 2020-01-29 RX ORDER — SIMETHICONE 80 MG/1
80 TABLET, CHEWABLE ORAL EVERY 4 HOURS
Refills: 0 | Status: DISCONTINUED | OUTPATIENT
Start: 2020-01-29 | End: 2020-02-01

## 2020-01-29 RX ORDER — ACETAMINOPHEN 500 MG
975 TABLET ORAL
Refills: 0 | Status: DISCONTINUED | OUTPATIENT
Start: 2020-01-29 | End: 2020-02-01

## 2020-01-29 RX ORDER — DIPHENHYDRAMINE HCL 50 MG
25 CAPSULE ORAL EVERY 6 HOURS
Refills: 0 | Status: DISCONTINUED | OUTPATIENT
Start: 2020-01-29 | End: 2020-02-01

## 2020-01-29 RX ORDER — OXYCODONE HYDROCHLORIDE 5 MG/1
5 TABLET ORAL ONCE
Refills: 0 | Status: DISCONTINUED | OUTPATIENT
Start: 2020-01-30 | End: 2020-02-01

## 2020-01-29 RX ORDER — SODIUM CHLORIDE 9 MG/ML
1000 INJECTION, SOLUTION INTRAVENOUS
Refills: 0 | Status: DISCONTINUED | OUTPATIENT
Start: 2020-01-29 | End: 2020-02-01

## 2020-01-29 RX ORDER — IBUPROFEN 200 MG
600 TABLET ORAL EVERY 6 HOURS
Refills: 0 | Status: COMPLETED | OUTPATIENT
Start: 2020-01-29 | End: 2020-12-27

## 2020-01-29 RX ORDER — SODIUM CHLORIDE 9 MG/ML
1000 INJECTION, SOLUTION INTRAVENOUS ONCE
Refills: 0 | Status: COMPLETED | OUTPATIENT
Start: 2020-01-29 | End: 2020-01-29

## 2020-01-29 RX ORDER — CEFAZOLIN SODIUM 1 G
2000 VIAL (EA) INJECTION ONCE
Refills: 0 | Status: COMPLETED | OUTPATIENT
Start: 2020-01-29 | End: 2020-01-29

## 2020-01-29 RX ORDER — METOCLOPRAMIDE HCL 10 MG
10 TABLET ORAL ONCE
Refills: 0 | Status: COMPLETED | OUTPATIENT
Start: 2020-01-29 | End: 2020-01-29

## 2020-01-29 RX ORDER — ENOXAPARIN SODIUM 100 MG/ML
40 INJECTION SUBCUTANEOUS EVERY 24 HOURS
Refills: 0 | Status: DISCONTINUED | OUTPATIENT
Start: 2020-01-30 | End: 2020-02-01

## 2020-01-29 RX ORDER — TETANUS TOXOID, REDUCED DIPHTHERIA TOXOID AND ACELLULAR PERTUSSIS VACCINE, ADSORBED 5; 2.5; 8; 8; 2.5 [IU]/.5ML; [IU]/.5ML; UG/.5ML; UG/.5ML; UG/.5ML
0.5 SUSPENSION INTRAMUSCULAR ONCE
Refills: 0 | Status: DISCONTINUED | OUTPATIENT
Start: 2020-01-29 | End: 2020-02-01

## 2020-01-29 RX ADMIN — Medication 10 MILLIGRAM(S): at 19:05

## 2020-01-29 RX ADMIN — SODIUM CHLORIDE 2000 MILLILITER(S): 9 INJECTION, SOLUTION INTRAVENOUS at 10:00

## 2020-01-29 RX ADMIN — Medication 30 MILLIGRAM(S): at 22:56

## 2020-01-29 RX ADMIN — SODIUM CHLORIDE 125 MILLILITER(S): 9 INJECTION, SOLUTION INTRAVENOUS at 16:00

## 2020-01-29 RX ADMIN — FAMOTIDINE 20 MILLIGRAM(S): 10 INJECTION INTRAVENOUS at 19:05

## 2020-01-29 RX ADMIN — SODIUM CHLORIDE 125 MILLILITER(S): 9 INJECTION, SOLUTION INTRAVENOUS at 19:05

## 2020-01-29 RX ADMIN — Medication 100 MILLIGRAM(S): at 20:10

## 2020-01-29 RX ADMIN — Medication 30 MILLILITER(S): at 15:22

## 2020-01-29 NOTE — OB PROVIDER H&P - NSHPPHYSICALEXAM_GEN_ALL_CORE
Vital Signs Last 24 Hrs  T(C): 36.7 (29 Jan 2020 11:13), Max: 36.7 (29 Jan 2020 11:13)  T(F): 98.1 (29 Jan 2020 11:13), Max: 98.1 (29 Jan 2020 11:13)  HR: 94 (29 Jan 2020 11:19) (94 - 94)  BP: 130/84 (29 Jan 2020 11:19) (130/84 - 130/84)  RR: 16 (29 Jan 2020 11:13) (16 - 16)    FHT: baseline 155, moderate variability, + accels, - decels  [place]: intermittent contractions    MFM sono: breech, posterior placenta

## 2020-01-29 NOTE — OB RN DELIVERY SUMMARY - NS_GENERALBABYACOMMENTA_OBGYN_ALL_OB_FT
2245- Transitional RN attempted to place baby on patients skin but patient "looked sleepy".   2300- This RN placed baby on patient to breastfeed but noted that the patient nipple is flat. Patient encouraged to request a nipple shield in post partum floor. Mother requested formula for the baby. Report endorsed to post partum floor.

## 2020-01-29 NOTE — OB PROVIDER H&P - ASSESSMENT
38yo  at 38 4/7 weeks gestation sent from [facility] for delivery due to NRFHT in the office    - Admit to L&D  - Admission labs sent  - Pre-op ancef  - Proceed with repeat  delivery due to breech presentation    Discussed with Dr. Gutierrez

## 2020-01-29 NOTE — OB RN PATIENT PROFILE - 'COMMUNITY AGENCIES/SUPPORT GROUPS, OB PROFILE
Assessment/Plan


Problem List:  


(1) Left arm numbness prob old residual to neck surgery


(2) Chest wall pain


ICD Codes:  R07.89 - Other chest pain


SNOMED:  419780685


(3) Diabetes


ICD Codes:  E11.9 - Type 2 diabetes mellitus without complications


SNOMED:  17534163


(4) Chest pain


ICD Codes:  R07.9 - Chest pain


SNOMED:  98567237


Status:  stable, progressing, tolerating diet


Assessment/Plan


ot pt diet pain control cbc bmp am dc plan snf





Subjective


Constitutional:  Reports: weakness


Allergies:  


Coded Allergies:  


     No Known Allergies (Unverified , 4/14/15)


All Systems:  reviewed and negative except above


Subjective


calm in bed sl cp





Objective





Last 24 Hour Vital Signs








  Date Time  Temp Pulse Resp B/P (MAP) Pulse Ox O2 Delivery O2 Flow Rate FiO2


 


4/9/18 12:00 98.6 63 19 105/62 95   





 98.6       


 


4/9/18 10:40  97 20   Room Air  21


 


4/9/18 09:37  98  126/67    


 


4/9/18 08:00 98.4 98 21 126/67 99   





 98.4       


 


4/9/18 05:12 98.1       


 


4/9/18 04:46 98.1 94 18 111/64 94 Room Air  





 98.1       


 


4/9/18 04:42 97.5       


 


4/9/18 01:13      Room Air  


 


4/9/18 00:34 97.5       


 


4/9/18 00:00 97.5 104 18 129/58 92 Room Air  





 97.5       


 


4/8/18 23:03  101 20  97 Room Air  21


 


4/8/18 22:46  105 20  97 Room Air  21


 


4/8/18 22:46        21


 


4/8/18 21:33  105 20   Room Air  21


 


4/8/18 21:22 98.1       


 


4/8/18 20:34  105  115/75    


 


4/8/18 20:34 98.1       


 


4/8/18 20:23 98.1       


 


4/8/18 20:00      Room Air  


 


4/8/18 20:00 98.2 105 20 115/75 97 Room Air  





 98.2       


 


4/8/18 16:35  85 14   Room Air  21


 


4/8/18 16:32 98.1       


 


4/8/18 16:26  84 14   Room Air  21


 


4/8/18 15:17 98.1       

















Intake and Output  


 


 4/8/18 4/9/18





 19:00 07:00


 


Intake Total 2000 ml 680 ml


 


Output Total 800 ml 


 


Balance 1200 ml 680 ml


 


  


 


Intake Oral 2000 ml 680 ml


 


Output Urine Total 800 ml 


 


# Voids 3 3








Laboratory Tests


4/9/18 05:45: 


White Blood Count 9.3, Red Blood Count 4.53L, Hemoglobin 13.4L, Hematocrit 39.0L

, Mean Corpuscular Volume 86, Mean Corpuscular Hemoglobin 29.5, Mean 

Corpuscular Hemoglobin Concent 34.2, Red Cell Distribution Width 12.0, Platelet 

Count 188, Mean Platelet Volume 8.2, Neutrophils (%) (Auto) 56.6, Lymphocytes (%

) (Auto) 32.0, Monocytes (%) (Auto) 8.2, Eosinophils (%) (Auto) 2.4, Basophils (

%) (Auto) 0.8, Sodium Level 138, Potassium Level 3.6, Chloride Level 103, 

Carbon Dioxide Level 26, Anion Gap 10, Blood Urea Nitrogen 12, Creatinine 0.8, 

Estimat Glomerular Filtration Rate > 60, Glucose Level 168H, Calcium Level 8.8


Height (Feet):  5


Height (Inches):  11.00


Weight (Pounds):  237


General Appearance:  alert


EENT:  normal ENT inspection


Neck:  normal alignment


Cardiovascular:  normal peripheral pulses, normal rate, regular rhythm


Respiratory/Chest:  chest wall non-tender, lungs clear, normal breath sounds


Abdomen:  normal bowel sounds, non tender, soft


Extremities:  normal inspection


Edema:  no edema noted Arm (L), no edema noted Arm (R), no edema noted Leg (L), 

no edema noted Leg (R), no edema noted Pedal (L), no edema noted Pedal (R), no 

edema noted Generalized


Neurologic:  responsive, motor weakness


Skin:  normal pigmentation, warm/dry











ROSE MARIE IRVING Apr 9, 2018 14:00 N/A

## 2020-01-29 NOTE — OB RN DELIVERY SUMMARY - NS_SEPSISRSKCALC_OBGYN_ALL_OB_FT
EOS calculated successfully. EOS Risk Factor: 0.5/1000 live births ([facility] national incidence); GA=38w4d; Temp=98.1; ROM=0.017; GBS='Negative'; Antibiotics='No antibiotics or any antibiotics < 2 hrs prior to birth'

## 2020-01-29 NOTE — OB RN PATIENT PROFILE - NS_ANESTHECONSULT_OBGYN_ALL_OB
keep dressing dry, clean, intact, for 2 days. After 2 days, remove dressing and leave steri strips on. You can shower with steri strips on. If it fall off after shower is OK, if not you leave it there, it will fall off by itself in 2-3 days. N/A

## 2020-01-29 NOTE — OB PROVIDER DELIVERY SUMMARY - NSPROVIDERDELIVERYNOTE_OBGYN_ALL_OB_FT
Viable male infant, wt 8-4, apgar 8/9  Hydrocele  Delivered in breech presentation   Staple skin closure

## 2020-01-29 NOTE — OB PROVIDER H&P - HISTORY OF PRESENT ILLNESS
Patient is a 38yo  at 38 4/7 weeks gestation sent from [facility] for delivery due to variable decelerations on NST    Patient reports good fetal movement. She denies contractions, vaginal bleeding and leakage of fluid.    This pregnancy has been complicated by:  1. Prior  section x 1  2. Breech presentation  3. History of thyroid cancer s/p thyroidectomy on levothyroxine  4. AMA    OBHx:  G1-   delivery at 32 weeks  PMH: thyroid cancer, now surgically hypothyroid  PSH: CS x 2007; thyroidectomy   Meds: Levothyroxine, PNV  All: NKDA

## 2020-01-29 NOTE — OB NEONATOLOGY/PEDIATRICIAN DELIVERY SUMMARY - NSPEDSNEONOTESA_OBGYN_ALL_OB_FT
Requested by DR Gutierrez to attend a RC/S of a 38 y/o at 38.4 weeks GA in labor.   She had + PNC, is B pos, HIV neg, HBsAg neg, RPR NR, Rubella Imm, GBS neg, hx of Hypothyroidism (S/P Thyroidectomy), on Levothyroxine, GDM diet controlled.  L&D: AROM at delivery.  Baby born breech with meconium, good cry, transferred to warmer, orally suctioned, dried, and examined.  Baby transferred to mom for STS.    A/P FT male IDM.  Monitor glucose as per protocol. Requested by DR Gutierrez to attend a RC/S of a 40 y/o at 38.4 weeks GA in labor.   She had + PNC, is B pos, HIV neg, HBsAg neg, RPR NR, Rubella Imm, GBS neg, hx of Hypothyroidism (S/P Thyroidectomy), on Levothyroxine, GDM diet controlled.  L&D: AROM at delivery.  Baby born breech with meconium stained amniotic fluids, good cry, transferred to warmer, orally suctioned, dried, and examined.  Baby then started to grunt, have retractions, and persisted to look dusky.  CPAP 5 started.  O2 sat 68% at 2 mins.  FIO2 adjusted to achieve target range O2 saturations.  Baby improved and CPAP was discontinued. Baby transferred to be transitioned.    A/P:  FT male with hydrocele.  Monitor respiratory status closely.  If develops persistent grunting or O2 desaturations then will transfer to NICU for further evaluation and management.  Otherwise will transition to Regular Nursery.  Monitor glucose as per IDM protocol.  A/P FT male IDM.  Monitor glucose as per protocol.

## 2020-01-30 LAB
BASOPHILS # BLD AUTO: 0.02 K/UL — SIGNIFICANT CHANGE UP (ref 0–0.2)
BASOPHILS NFR BLD AUTO: 0.2 % — SIGNIFICANT CHANGE UP (ref 0–2)
EOSINOPHIL # BLD AUTO: 0.01 K/UL — SIGNIFICANT CHANGE UP (ref 0–0.5)
EOSINOPHIL NFR BLD AUTO: 0.1 % — SIGNIFICANT CHANGE UP (ref 0–6)
HCT VFR BLD CALC: 34.4 % — LOW (ref 34.5–45)
HGB BLD-MCNC: 10.8 G/DL — LOW (ref 11.5–15.5)
IMM GRANULOCYTES NFR BLD AUTO: 0.4 % — SIGNIFICANT CHANGE UP (ref 0–1.5)
LYMPHOCYTES # BLD AUTO: 1.55 K/UL — SIGNIFICANT CHANGE UP (ref 1–3.3)
LYMPHOCYTES # BLD AUTO: 13.8 % — SIGNIFICANT CHANGE UP (ref 13–44)
MCHC RBC-ENTMCNC: 26.2 PG — LOW (ref 27–34)
MCHC RBC-ENTMCNC: 31.4 GM/DL — LOW (ref 32–36)
MCV RBC AUTO: 83.3 FL — SIGNIFICANT CHANGE UP (ref 80–100)
MONOCYTES # BLD AUTO: 0.51 K/UL — SIGNIFICANT CHANGE UP (ref 0–0.9)
MONOCYTES NFR BLD AUTO: 4.5 % — SIGNIFICANT CHANGE UP (ref 2–14)
NEUTROPHILS # BLD AUTO: 9.12 K/UL — HIGH (ref 1.8–7.4)
NEUTROPHILS NFR BLD AUTO: 81 % — HIGH (ref 43–77)
PLATELET # BLD AUTO: 168 K/UL — SIGNIFICANT CHANGE UP (ref 150–400)
RBC # BLD: 4.13 M/UL — SIGNIFICANT CHANGE UP (ref 3.8–5.2)
RBC # FLD: 17.6 % — HIGH (ref 10.3–14.5)
WBC # BLD: 11.26 K/UL — HIGH (ref 3.8–10.5)
WBC # FLD AUTO: 11.26 K/UL — HIGH (ref 3.8–10.5)

## 2020-01-30 RX ORDER — IBUPROFEN 200 MG
600 TABLET ORAL EVERY 6 HOURS
Refills: 0 | Status: DISCONTINUED | OUTPATIENT
Start: 2020-01-30 | End: 2020-02-01

## 2020-01-30 RX ORDER — ONDANSETRON 8 MG/1
4 TABLET, FILM COATED ORAL EVERY 6 HOURS
Refills: 0 | Status: DISCONTINUED | OUTPATIENT
Start: 2020-01-30 | End: 2020-02-01

## 2020-01-30 RX ADMIN — Medication 30 MILLIGRAM(S): at 06:29

## 2020-01-30 RX ADMIN — Medication 975 MILLIGRAM(S): at 21:00

## 2020-01-30 RX ADMIN — ONDANSETRON 4 MILLIGRAM(S): 8 TABLET, FILM COATED ORAL at 09:37

## 2020-01-30 RX ADMIN — Medication 30 MILLIGRAM(S): at 12:11

## 2020-01-30 RX ADMIN — Medication 975 MILLIGRAM(S): at 22:00

## 2020-01-30 RX ADMIN — Medication 30 MILLIGRAM(S): at 13:00

## 2020-01-30 RX ADMIN — Medication 975 MILLIGRAM(S): at 09:19

## 2020-01-30 RX ADMIN — Medication 600 MILLIGRAM(S): at 23:52

## 2020-01-30 RX ADMIN — ENOXAPARIN SODIUM 40 MILLIGRAM(S): 100 INJECTION SUBCUTANEOUS at 09:19

## 2020-01-30 RX ADMIN — Medication 975 MILLIGRAM(S): at 10:00

## 2020-01-30 RX ADMIN — Medication 30 MILLIGRAM(S): at 06:14

## 2020-01-30 NOTE — PROGRESS NOTE ADULT - SUBJECTIVE AND OBJECTIVE BOX
SIMRAN CHRISTOPHER is a 39y  s/p rCS now POD1 of a viable male infant.     SUBJECTIVE:  No acute events overnight, patient has no complaints.  Pain is well controlled with PRN pain medication.  She is ambulating, winters in place, tolerating PO. Denies N/V  -flatus, -BM.  minimal lochia.    OBJECTIVE:  Physical exam:  General: AOx3, NAD.  Abdomen: +BS, Soft, appropriately tender to palpitation, firm uterine fundus at umbilicus. Dressing removed, incision closed with staples is clean, dry, intact.  Ext: No DVT signs, warm extremities.    Vital Signs Last 24 Hrs  T(C): 36.7 (2020 04:39), Max: 37.2 (2020 20:58)  T(F): 98.1 (2020 04:39), Max: 99 (2020 20:58)  HR: 76 (2020 04:39) (75 - 114)  BP: 135/73 (2020 04:39) (118/86 - 142/73)  RR: 18 (2020 04:39) (14 - 24)  SpO2: 96% (2020 04:39) (96% - 99%)

## 2020-01-30 NOTE — PROGRESS NOTE ADULT - PROBLEM SELECTOR PLAN 1
- Stable  - Hgb 11.8 -> f/u postop CBC  - Pain: well controlled on PO pain meds  - GI: regular diet  - : winters in place, to be d/c this morning  - DVT ppx: lovenox  - Continue routine postop care  - Dispo: POD3, unless otherwise specified

## 2020-01-30 NOTE — DISCHARGE NOTE OB - HOSPITAL COURSE
POD#3 s/p rCS + BS of a viable male infant. Patient transferred to post partum unit, uncomplicated hospital course. At the time of discharge patient was tolerating regular diet PO, ambulating, voiding, and demonstrating bowel function. Pain well controlled with pain medications PRN.

## 2020-01-30 NOTE — DISCHARGE NOTE OB - CARE PROVIDER_API CALL
Seth Gutierrez)  Obstetrics and Gynecology  [address], [address]  [place] [address]  Phone: [phone]  Fax: [phone]  Follow Up Time:

## 2020-01-30 NOTE — DISCHARGE NOTE OB - LAUNCH MEDICATION RECONCILIATION
Called pt to confirm 8:00am arrival time for procedure. Gave pt NPO instructions and gave pt opportunity to ask questions. Pt verbalized understanding.   <<-----Click here for Discharge Medication Review

## 2020-01-30 NOTE — DISCHARGE NOTE OB - MATERIALS PROVIDED
Back To Sleep Handout/Tdap Vaccination (VIS Pub Date: 2012)/Breastfeeding Log/Shaken Baby Prevention Handout/Breastfeeding Guide and Packet/Birth Certificate Instructions/Guide to Postpartum Care/[facility] Screening Program

## 2020-01-30 NOTE — DISCHARGE NOTE OB - PATIENT PORTAL LINK FT
You can access the FollowMyHealth Patient Portal offered by [facility] by registering at the following website: [email or web address]. By joining [employer]’s FollowMyHealth portal, you will also be able to view your health information using other applications (apps) compatible with our system.

## 2020-01-30 NOTE — DISCHARGE NOTE OB - MEDICATION SUMMARY - MEDICATIONS TO STOP TAKING
I will STOP taking the medications listed below when I get home from the hospital:    Tylenol 500 mg oral tablet  -- 2 tab(s) by mouth every 6 hours, As Needed    acetaminophen-oxyCODONE 325 mg-5 mg oral tablet  -- 1 tab(s) by mouth every 4 hours, As needed, Mild Pain    calcium gluconate 500 mg oral tablet  -- 4 tab(s) by mouth 4 times a day    vit D  -- 04514 unit(s) by mouth 2 times a day    Prenatal vitamins  -- once a day

## 2020-01-31 RX ADMIN — Medication 600 MILLIGRAM(S): at 05:37

## 2020-01-31 RX ADMIN — Medication 600 MILLIGRAM(S): at 18:52

## 2020-01-31 RX ADMIN — Medication 975 MILLIGRAM(S): at 04:37

## 2020-01-31 RX ADMIN — Medication 600 MILLIGRAM(S): at 14:20

## 2020-01-31 RX ADMIN — Medication 975 MILLIGRAM(S): at 16:55

## 2020-01-31 RX ADMIN — Medication 975 MILLIGRAM(S): at 23:00

## 2020-01-31 RX ADMIN — Medication 975 MILLIGRAM(S): at 03:37

## 2020-01-31 RX ADMIN — Medication 975 MILLIGRAM(S): at 10:15

## 2020-01-31 RX ADMIN — Medication 975 MILLIGRAM(S): at 22:04

## 2020-01-31 RX ADMIN — Medication 600 MILLIGRAM(S): at 00:52

## 2020-01-31 RX ADMIN — SIMETHICONE 80 MILLIGRAM(S): 80 TABLET, CHEWABLE ORAL at 16:23

## 2020-01-31 RX ADMIN — Medication 600 MILLIGRAM(S): at 06:37

## 2020-01-31 RX ADMIN — Medication 600 MILLIGRAM(S): at 19:50

## 2020-01-31 RX ADMIN — Medication 975 MILLIGRAM(S): at 09:33

## 2020-01-31 RX ADMIN — ENOXAPARIN SODIUM 40 MILLIGRAM(S): 100 INJECTION SUBCUTANEOUS at 09:37

## 2020-01-31 RX ADMIN — Medication 600 MILLIGRAM(S): at 13:22

## 2020-01-31 RX ADMIN — Medication 975 MILLIGRAM(S): at 16:21

## 2020-01-31 NOTE — PROGRESS NOTE ADULT - SUBJECTIVE AND OBJECTIVE BOX
Postpartum Note,  Section  39y  s/p rCS/BTL now POD2 of a viable male infant.     Subjective:  No acute events overnight. The patient is feeling well.   She is tolerating a diet and denies N/V.    Patient is having normal postpartum bleeding which is decreasing in amount.    Urinating appropriately.   +BM/+flatus.    Physical exam:    Vital Signs Last 24 Hrs  T(C): 36.7 (2020 21:31), Max: 36.7 (2020 09:26)  T(F): 98 (2020 21:31), Max: 98 (2020 09:26)  HR: 80 (2020 21:31) (67 - 89)  BP: 104/61 (2020 21:31) (104/61 - 119/71)  RR: 18 (2020 21:31) (18 - 18)  SpO2: 98% (2020 21:31) (98% - 98%)    Heart: RRR  Lungs: CTABL  Breast: non tender, not engorged   Abdomen: Soft, nontender, no distension, firm uterine fundus, the incision is clean dry and intact with staples  Ext: No DVT signs, warm extremities    LABS:                        10.8   11.26 )-----------( 168      ( 2020 07:09 )             34.4

## 2020-01-31 NOTE — PROGRESS NOTE ADULT - ASSESSMENT
39y  s/p rCS/BTL now POD2 of a viable male infant.   Patient is feeling well overall.     Continue the current pain medication.   Encourage ambulation and regular diet.   Encourage incentive spirometry.  Promote mother baby bonding.   : voiding spontaneously.  GI: +flatus, stool softeners PRN.  Continue DVT ppx: SCDs, ambulating, lovenox.   Tentative d/c plans for POD# 3-4.

## 2020-02-01 ENCOUNTER — OUTPATIENT (OUTPATIENT)
Dept: OUTPATIENT SERVICES | Facility: HOSPITAL | Age: 40
LOS: 1 days | End: 2020-02-01
Payer: MEDICAID

## 2020-02-01 ENCOUNTER — APPOINTMENT (OUTPATIENT)
Dept: OBGYN | Facility: HOSPITAL | Age: 40
End: 2020-02-01
Payer: MEDICAID

## 2020-02-01 VITALS
TEMPERATURE: 99 F | HEART RATE: 70 BPM | SYSTOLIC BLOOD PRESSURE: 127 MMHG | RESPIRATION RATE: 18 BRPM | DIASTOLIC BLOOD PRESSURE: 80 MMHG

## 2020-02-01 DIAGNOSIS — E89.0 POSTPROCEDURAL HYPOTHYROIDISM: Chronic | ICD-10-CM

## 2020-02-01 DIAGNOSIS — Z98.89 OTHER SPECIFIED POSTPROCEDURAL STATES: Chronic | ICD-10-CM

## 2020-02-01 PROCEDURE — T1013: CPT

## 2020-02-01 PROCEDURE — 85027 COMPLETE CBC AUTOMATED: CPT

## 2020-02-01 PROCEDURE — XXXXX: CPT

## 2020-02-01 PROCEDURE — 88302 TISSUE EXAM BY PATHOLOGIST: CPT

## 2020-02-01 PROCEDURE — 82962 GLUCOSE BLOOD TEST: CPT

## 2020-02-01 PROCEDURE — 36415 COLL VENOUS BLD VENIPUNCTURE: CPT

## 2020-02-01 PROCEDURE — G9001: CPT

## 2020-02-01 RX ORDER — IBUPROFEN 200 MG
1 TABLET ORAL
Qty: 28 | Refills: 0
Start: 2020-02-01 | End: 2020-02-07

## 2020-02-01 RX ADMIN — Medication 975 MILLIGRAM(S): at 09:10

## 2020-02-01 RX ADMIN — Medication 600 MILLIGRAM(S): at 12:41

## 2020-02-01 RX ADMIN — Medication 975 MILLIGRAM(S): at 08:40

## 2020-02-01 RX ADMIN — SIMETHICONE 80 MILLIGRAM(S): 80 TABLET, CHEWABLE ORAL at 01:13

## 2020-02-01 RX ADMIN — Medication 600 MILLIGRAM(S): at 13:11

## 2020-02-01 RX ADMIN — SIMETHICONE 80 MILLIGRAM(S): 80 TABLET, CHEWABLE ORAL at 12:41

## 2020-02-01 RX ADMIN — SIMETHICONE 80 MILLIGRAM(S): 80 TABLET, CHEWABLE ORAL at 08:40

## 2020-02-01 NOTE — PROGRESS NOTE ADULT - SUBJECTIVE AND OBJECTIVE BOX
SIMRAN CHRISTOPHER is a 39y  s/p rCS + BS now POD3 of a viable male infant.     SUBJECTIVE:  No acute events overnight, patient has no complaints.  Pain is well controlled with PRN pain medication.  She is ambulating, voiding, tolerating PO. Denies N/V  +flatus, +BM.  minimal lochia.    OBJECTIVE:  Physical exam:  General: AOx3, NAD.  Abdomen: +BS, Soft, appropriately tender to palpitation, firm uterine fundus at umbilicus. Incision is closed with staples, clean dry and intact.  Ext: No DVT signs, warm extremities.    Vital Signs Last 24 Hrs  T(C): 36.7 (2020 21:14), Max: 36.9 (2020 08:44)  T(F): 98.1 (2020 21:14), Max: 98.4 (2020 08:44)  HR: 68 (2020 21:14) (68 - 74)  BP: 127/80 (2020 21:14) (127/80 - 128/78)  RR: 18 (2020 21:14) (18 - 18)  SpO2: 98% (2020 21:14) (98% - 98%)    LABS:                        10.8   11.26 )-----------( 168      ( 2020 07:09 )             34.4

## 2020-02-01 NOTE — PROGRESS NOTE ADULT - PROBLEM SELECTOR PLAN 1
- Stable  - Hgb 11.8 -> 10.8 postop  - Pain: well controlled on PO pain meds  - GI: regular diet  - : voiding  - DVT ppx: lovenox  - Continue routine postop care  - Dispo: POD3, unless otherwise specified

## 2020-02-04 LAB — SURGICAL PATHOLOGY STUDY: SIGNIFICANT CHANGE UP

## 2020-02-07 ENCOUNTER — APPOINTMENT (OUTPATIENT)
Dept: OBGYN | Facility: CLINIC | Age: 40
End: 2020-02-07
Payer: MEDICAID

## 2020-02-07 VITALS
DIASTOLIC BLOOD PRESSURE: 65 MMHG | WEIGHT: 168.56 LBS | HEIGHT: 67 IN | BODY MASS INDEX: 26.46 KG/M2 | SYSTOLIC BLOOD PRESSURE: 110 MMHG

## 2020-02-07 VITALS
WEIGHT: 173.56 LBS | SYSTOLIC BLOOD PRESSURE: 113 MMHG | DIASTOLIC BLOOD PRESSURE: 79 MMHG | BODY MASS INDEX: 27.18 KG/M2 | HEART RATE: 75 BPM

## 2020-02-07 PROCEDURE — 0503F POSTPARTUM CARE VISIT: CPT

## 2020-02-13 DIAGNOSIS — O34.219 MATERNAL CARE FOR UNSPECIFIED TYPE SCAR FROM PREVIOUS CESAREAN DELIVERY: ICD-10-CM

## 2020-02-13 DIAGNOSIS — Z09 ENCOUNTER FOR FOLLOW-UP EXAMINATION AFTER COMPLETED TREATMENT FOR CONDITIONS OTHER THAN MALIGNANT NEOPLASM: ICD-10-CM

## 2020-02-13 DIAGNOSIS — O09.529 SUPERVISION OF ELDERLY MULTIGRAVIDA, UNSPECIFIED TRIMESTER: ICD-10-CM

## 2020-02-13 DIAGNOSIS — O24.410 GESTATIONAL DIABETES MELLITUS IN PREGNANCY, DIET CONTROLLED: ICD-10-CM

## 2020-02-13 DIAGNOSIS — E03.9 ENDOCRINE, NUTRITIONAL AND METABOLIC DISEASES COMPLICATING PREGNANCY, THIRD TRIMESTER: ICD-10-CM

## 2020-02-13 DIAGNOSIS — O99.283 ENDOCRINE, NUTRITIONAL AND METABOLIC DISEASES COMPLICATING PREGNANCY, THIRD TRIMESTER: ICD-10-CM

## 2020-02-13 DIAGNOSIS — O09.219 SUPERVISION OF PREGNANCY WITH HISTORY OF PRE-TERM LABOR, UNSPECIFIED TRIMESTER: ICD-10-CM

## 2020-03-03 DIAGNOSIS — Z71.89 OTHER SPECIFIED COUNSELING: ICD-10-CM

## 2020-03-13 ENCOUNTER — APPOINTMENT (OUTPATIENT)
Dept: OBGYN | Facility: CLINIC | Age: 40
End: 2020-03-13
Payer: MEDICAID

## 2020-03-13 ENCOUNTER — LABORATORY RESULT (OUTPATIENT)
Age: 40
End: 2020-03-13

## 2020-03-13 VITALS
SYSTOLIC BLOOD PRESSURE: 106 MMHG | BODY MASS INDEX: 25.9 KG/M2 | TEMPERATURE: 98.2 F | WEIGHT: 165 LBS | DIASTOLIC BLOOD PRESSURE: 70 MMHG | HEIGHT: 67 IN

## 2020-03-13 PROCEDURE — 99214 OFFICE O/P EST MOD 30 MIN: CPT

## 2020-03-23 LAB
C TRACH RRNA SPEC QL NAA+PROBE: NOT DETECTED
CYTOLOGY CVX/VAG DOC THIN PREP: ABNORMAL
HPV HIGH+LOW RISK DNA PNL CVX: DETECTED
N GONORRHOEA RRNA SPEC QL NAA+PROBE: NOT DETECTED
SOURCE TP AMPLIFICATION: NORMAL

## 2020-04-08 NOTE — OB RN PATIENT PROFILE - COMFORT/ACCEPTABLE PAIN LEVEL (0-10)
Discharge Planner SLP   Patient plan for discharge: home  Current status: Swallow tx completed with end portion of meal. Pt tolerating current diet. Trials of thin/regular completed. Pt with periodic throat clear with both textures, stable RR and Sp02. Reports mild globus sensation with solids clearing with liquid washes. No other signs/sx. Recommend upgrade to regular solids with self-selection of softer/moister as needed; downgrade back to DD3 if any concerns. Will monitor tolerance, pt agreeable to video swallow study if needed.   Barriers to return to prior living situation: no SLP barriers  Recommendations for discharge: home  Rationale for recommendations: Pt will likely meet IP SLP goals in x1-2 sessions, can complete video if concerns arise.        Entered by: Maddy Lemus 04/08/2020 12:47 PM       
Unknown if ever smoked
7

## 2020-04-30 ENCOUNTER — APPOINTMENT (OUTPATIENT)
Dept: NEUROLOGY | Facility: CLINIC | Age: 40
End: 2020-04-30

## 2020-06-24 ENCOUNTER — APPOINTMENT (OUTPATIENT)
Dept: NEUROLOGY | Facility: CLINIC | Age: 40
End: 2020-06-24

## 2020-08-05 NOTE — BRIEF OPERATIVE NOTE - ASSISTANT(S)
CM met with pt bedside to complete discharge assessment. Pt verified information on facesheet as correct. Denies POA/LW. IVAN is his daughter, Tejal. Reports living at listed address with his mother. PCP is Dr. Kawasaki. [facility] [address]. Denies any DME. Transportation home at discharge will be provided by a friend. Pt needs rw and bsc prior to DC. Pt did not want SC or tub tx bench since not an insurance covered item. Discharge plan is home with [facility] home [facility] (disclosure signed). CM following to assist in any DC needs.      08/04/20 1600   Discharge Assessment   Assessment Type Discharge Planning Assessment   Confirmed/corrected address and phone number on facesheet? Yes   Assessment information obtained from? Patient   Communicated expected length of stay with patient/caregiver yes   Prior to hospitilization cognitive status: Alert/Oriented   Prior to hospitalization functional status: Independent   Current cognitive status: Alert/Oriented   Current Functional Status: Assistive Equipment   Lives With parent(s)   Able to Return to Prior Arrangements yes   Is patient able to care for self after discharge? Yes   Patient's perception of discharge disposition home health   Readmission Within the Last 30 Days no previous admission in last 30 days   Patient currently being followed by outpatient case management? No   Patient currently receives any other outside agency services? No   Equipment Currently Used at Home none   Do you have any problems affording any of your prescribed medications? No   Is the patient taking medications as prescribed? yes   Does the patient have transportation home? Yes   Transportation Anticipated family or friend will provide   Does the patient receive services at the [facility]? No   Discharge Plan A Home Health   Discharge Plan B Home with family   DME Needed Upon Discharge  walker, rolling;bedside commode   Patient/Family in Agreement with Plan yes      [place]

## 2020-09-30 ENCOUNTER — APPOINTMENT (OUTPATIENT)
Dept: OBGYN | Facility: CLINIC | Age: 40
End: 2020-09-30
Payer: MEDICAID

## 2020-09-30 VITALS
WEIGHT: 173.56 LBS | DIASTOLIC BLOOD PRESSURE: 79 MMHG | SYSTOLIC BLOOD PRESSURE: 115 MMHG | BODY MASS INDEX: 27.18 KG/M2

## 2020-09-30 DIAGNOSIS — Z12.39 ENCOUNTER FOR OTHER SCREENING FOR MALIGNANT NEOPLASM OF BREAST: ICD-10-CM

## 2020-09-30 PROCEDURE — 99214 OFFICE O/P EST MOD 30 MIN: CPT

## 2020-09-30 NOTE — PHYSICAL EXAM
[Labia Majora] : normal [Labia Minora] : normal [Moderate] : There was moderate vaginal bleeding [Normal] : normal [Uterine Adnexae] : normal [Adnexa Tenderness] : tender [Adnexa Tenderness On The Right] : tender  [Adnexa Tenderness On The Left] : tender

## 2020-10-12 ENCOUNTER — OUTPATIENT (OUTPATIENT)
Dept: OUTPATIENT SERVICES | Facility: HOSPITAL | Age: 40
LOS: 1 days | End: 2020-10-12
Payer: MEDICAID

## 2020-10-12 ENCOUNTER — APPOINTMENT (OUTPATIENT)
Dept: ULTRASOUND IMAGING | Facility: CLINIC | Age: 40
End: 2020-10-12
Payer: MEDICAID

## 2020-10-12 DIAGNOSIS — Z98.89 OTHER SPECIFIED POSTPROCEDURAL STATES: Chronic | ICD-10-CM

## 2020-10-12 DIAGNOSIS — Z00.8 ENCOUNTER FOR OTHER GENERAL EXAMINATION: ICD-10-CM

## 2020-10-12 DIAGNOSIS — R10.2 PELVIC AND PERINEAL PAIN: ICD-10-CM

## 2020-10-12 DIAGNOSIS — E89.0 POSTPROCEDURAL HYPOTHYROIDISM: Chronic | ICD-10-CM

## 2020-10-12 PROCEDURE — 76856 US EXAM PELVIC COMPLETE: CPT

## 2020-10-12 PROCEDURE — 76856 US EXAM PELVIC COMPLETE: CPT | Mod: 26

## 2020-10-29 ENCOUNTER — APPOINTMENT (OUTPATIENT)
Dept: OBGYN | Facility: CLINIC | Age: 40
End: 2020-10-29
Payer: MEDICAID

## 2020-10-29 VITALS — DIASTOLIC BLOOD PRESSURE: 86 MMHG | WEIGHT: 173 LBS | BODY MASS INDEX: 27.1 KG/M2 | SYSTOLIC BLOOD PRESSURE: 132 MMHG

## 2020-10-29 LAB
HCG UR QL: NEGATIVE
QUALITY CONTROL: YES

## 2020-10-29 PROCEDURE — 99072 ADDL SUPL MATRL&STAF TM PHE: CPT

## 2020-10-29 PROCEDURE — 57454 BX/CURETT OF CERVIX W/SCOPE: CPT

## 2020-10-29 NOTE — PROCEDURE
[Colposcopy] : Colposcopy  [Time out performed] : Pre-procedure time out performed.  Patient's name, date of birth and procedure confirmed. [Consent Obtained] : Consent obtained [Risks] : risks [Benefits] : benefits [Alternatives] : alternatives [Patient] : patient [Infection] : infection [Bleeding] : bleeding [Allergic Reaction] : allergic reaction [ASCUS] : ASCUS [HPV High Risk] : HPV high risk [Colposcopy Adequate] : colposcopy adequate [Pap Performed] : pap not performed [SCI Fully Visualized] : SCI fully visualized [No Abnormalities] : no abnormalities [Lesion] : lesion seen [Biopsy] : biopsy taken [Hemostasis Obtained] : Hemostasis obtained [Tolerated Well] : the patient tolerated the procedure well [de-identified] : 2 [de-identified] : cervical polyp noted [de-identified] : cervical polyp noted in the endocervical canal [de-identified] :  Biopsy taken at 12:00 position and the polyps were also biopsied. [de-identified] : 40-year-old patient with ascus and positive high risk HPV a colposcopy.Endocervical polyp noted. No acetowhite changes or other lesions noted Cervical biopsy and biopsy at 12:00 position performed. Endocervical curetting obtained. The patient tolerated the procedure well

## 2020-11-04 LAB — CORE LAB BIOPSY: NORMAL

## 2020-11-14 ENCOUNTER — RESULT REVIEW (OUTPATIENT)
Age: 40
End: 2020-11-14

## 2020-11-14 ENCOUNTER — OUTPATIENT (OUTPATIENT)
Dept: OUTPATIENT SERVICES | Facility: HOSPITAL | Age: 40
LOS: 1 days | End: 2020-11-14
Payer: MEDICAID

## 2020-11-14 ENCOUNTER — APPOINTMENT (OUTPATIENT)
Dept: MAMMOGRAPHY | Facility: CLINIC | Age: 40
End: 2020-11-14
Payer: MEDICAID

## 2020-11-14 DIAGNOSIS — E89.0 POSTPROCEDURAL HYPOTHYROIDISM: Chronic | ICD-10-CM

## 2020-11-14 DIAGNOSIS — Z98.89 OTHER SPECIFIED POSTPROCEDURAL STATES: Chronic | ICD-10-CM

## 2020-11-14 DIAGNOSIS — Z12.39 ENCOUNTER FOR OTHER SCREENING FOR MALIGNANT NEOPLASM OF BREAST: ICD-10-CM

## 2020-11-14 PROCEDURE — 77067 SCR MAMMO BI INCL CAD: CPT | Mod: 26

## 2020-11-14 PROCEDURE — 77063 BREAST TOMOSYNTHESIS BI: CPT

## 2020-11-14 PROCEDURE — 77067 SCR MAMMO BI INCL CAD: CPT

## 2020-11-14 PROCEDURE — 77063 BREAST TOMOSYNTHESIS BI: CPT | Mod: 26

## 2020-11-24 ENCOUNTER — APPOINTMENT (OUTPATIENT)
Dept: OBGYN | Facility: CLINIC | Age: 40
End: 2020-11-24
Payer: MEDICAID

## 2020-11-24 VITALS
SYSTOLIC BLOOD PRESSURE: 120 MMHG | BODY MASS INDEX: 27.02 KG/M2 | DIASTOLIC BLOOD PRESSURE: 79 MMHG | WEIGHT: 172.13 LBS | HEIGHT: 67 IN | HEART RATE: 78 BPM

## 2020-11-24 DIAGNOSIS — R87.619 UNSPECIFIED ABNORMAL CYTOLOGICAL FINDINGS IN SPECIMENS FROM CERVIX UTERI: ICD-10-CM

## 2020-11-24 PROCEDURE — 99072 ADDL SUPL MATRL&STAF TM PHE: CPT

## 2020-11-24 PROCEDURE — 99213 OFFICE O/P EST LOW 20 MIN: CPT

## 2021-01-05 NOTE — PROGRESS NOTE ADULT - PROBLEM/PLAN-1
Outpatient Physical Therapy Ortho Progress Note       Patient Name: London Calabrese  : 1985  MRN: [id number]  Today's Date: 2021      Visit Date: 2021    Visit Dx:    ICD-10-CM ICD-9-CM   1. Concussion with loss of consciousness of 30 minutes or less, initial encounter  S06.0X1A 850.11   2. Cervicalgia  M54.2 723.1   3. Head trauma, initial encounter  S09.90XA 959.01       Patient Active Problem List   Diagnosis   • Cigarette smoker   • BMI 26.0-26.9,adult   • Head trauma, initial encounter   • Concussion with loss of consciousness of 30 minutes or less   • Post-traumatic headache   • Cervicalgia   • PTSD (post-traumatic stress disorder)        Past Medical History:   Diagnosis Date   • Brain concussion     work injury in 2020   • Headache    • Neck pain     from the work injury in 2020        Past Surgical History:   Procedure Laterality Date   • NO PAST SURGERIES                         PT Assessment/Plan     Row Name 21 1500          PT Assessment    Functional Limitations  Limitation in home management;Limitations in community activities;Performance in leisure activities;Performance in self-care ADL;Performance in work activities  -KR     Impairments  Muscle strength;Pain;Posture;Range of motion  -KR     Assessment Comments  His NDI score has improved six points since 20. His B LE SLS has increased by two seconds as well. He has yet to achieve any goals at this time but he is progressing towards all of his goals as compared to the initial evaluation. However, we are challenged by the fact that we are waging a campaign on three seperate fronts, his neck pain, post concussion syndrome, and vestibular issues.   -EC     Rehab Potential  Good  -KR     Patient/caregiver participated in establishment of treatment plan and goals  Yes  -KR     Patient would benefit from skilled therapy intervention  Yes  -KR        PT Plan    Predicted Duration of Therapy Intervention (PT)  6-8  "visits  -KR     Planned CPT's?  PT RE-EVAL: 64779;PT THER PROC EA 15 MIN: 37521;PT THER ACT EA 15 MIN: 71836;PT MANUAL THERAPY EA 15 MIN: 24269;PT NEUROMUSC RE-EDUCATION EA 15 MIN: 91979;PT GAIT TRAINING EA 15 MIN: 55609;PT HOT OR COLD PACK TREAT MCARE;PT ELECTRICAL STIM UNATTEND: ;PT ELECTRICAL STIM ATTD EA 15 MIN: 87857;PT ULTRASOUND EA 15 MIN: 04928;PT TRACTION CERVICAL: 38375  -KR     PT Plan Comments  Continue to work to improve his postural control, continue progressing static balance challenges and then reduce his reliance on his vision.  -EC       User Key  (r) = Recorded By, (t) = Taken By, (c) = Cosigned By    Initials Name Provider Type    EC Eusebio Gonzalez, PTA Physical Therapy Assistant    KR Ivette Betts, PT DPT Physical Therapist            OP Exercises     Row Name 01/04/21 1500 01/04/21 1400          Subjective Comments    Subjective Comments  --  He reports a little HA and pain along base of skull. Still has \"major dizziness\"  when he bends over and if he bends over longer than a few seconds he has \"black spots.\"  -EC        Subjective Pain    Able to rate subjective pain?  --  yes  -EC     Pre-Treatment Pain Level  --  6  -EC     Post-Treatment Pain Level  --  6  -EC        Total Minutes    36248 -  PT Neuromuscular Reeducation Minutes  --  25  -EC     15062 - PT Therapeutic Activity Minutes  --  15  -EC     39226 - PT Manual Therapy Minutes  15  -EC  --        Exercise 1    Exercise Name 1  --  reviewed all gosls for progress note for continued authorizations (see goals section)  -EC        Exercise 2    Exercise Name 2  --  obtained an updated NDI (see outcome measures section)  -EC        Exercise 3    Exercise Name 3  --  maintainingn midline on wobble board progressed from B UE support to unilateral UE support to none  -EC        Exercise 4    Exercise Name 4  --  B tandem stance with B UE support progressed to unilateral , then no UE support then same EC  -EC       User Key  " (r) = Recorded By, (t) = Taken By, (c) = Cosigned By    Initials Name Provider Type    RICHARD Eusebio Gonzalez, PTA Physical Therapy Assistant                      Manual Rx (last 36 hours)      Manual Treatments     Row Name 01/04/21 1500             Total Minutes    56933 - PT Manual Therapy Minutes  15  -EC         Manual Rx 1    Manual Rx 1 Location  cervical   -EC      Manual Rx 1 Type  suboccipital releases,  manual traction, and traction with B lateral bends  -EC      Manual Rx 1 Grade  2 and 3  -EC      Manual Rx 1 Duration  15  -EC        User Key  (r) = Recorded By, (t) = Taken By, (c) = Cosigned By    Initials Name Provider Type    RICHARD Gonzalez Edmauricio HENDRIX, PTA Physical Therapy Assistant          PT OP Goals     Row Name 01/04/21 1400          PT Short Term Goals    STG Date to Achieve  01/20/21  -EC     STG 1  Pt will demonstrate 50 degrees or greater (B) cervical rotaiton.   -EC     STG 1 Progress  Ongoing  -EC     STG 1 Progress Comments  L 44 degrees R 42 degrees AROM today  -EC        Long Term Goals    LTG Date to Achieve  02/03/21  -EC     LTG 1  Pt will demonstrate 65 degrees or greater (B) cervical rotaiton.   -EC     LTG 1 Progress  Ongoing  -EC     LTG 1 Progress Comments  see STG #1  -EC     LTG 2  Pt will score 10 or less on the NDI.   -EC     LTG 2 Progress  Ongoing  -EC     LTG 2 Progress Comments  29 today  -EC     LTG 3  Pt will be indpendent with HEP to include postural strengthening and vestibular exercises.   -EC     LTG 3 Progress  Ongoing  -EC     LTG 3 Progress Comments  he reported all components   -EC     LTG 4  Pt will report no dizziness with bending over for one week.   -EC     LTG 4 Progress  Ongoing  -EC     LTG 4 Progress Comments  still has dizziness with every occasion.  -EC     LTG 5  Pt will demonstrate SLS for 30 seconds or greater (B).   -EC     LTG 5 Progress  Ongoing  -EC     LTG 5 Progress Comments  6 sec L LE  7 sec R LE with 2 inch step  -EC        Time Calculation    PT  Goal Re-Cert Due Date  02/03/21  -       User Key  (r) = Recorded By, (t) = Taken By, (c) = Cosigned By    Initials Name Provider Type    Eusebio Marmolejo PTA Physical Therapy Assistant          Therapy Education  Given: Symptoms/condition management, HEP  Program: Reinforced  How Provided: Verbal  Provided to: Patient  Level of Understanding: Verbalized    Outcome Measure Options: Neck Disability Index (NDI)  Neck Disability Index  Section 1 - Pain Intensity: The pain is moderate at the moment.  Section 2 - Personal Care: I can look after myself normally, but it causes extra pain.  Section 3 - Lifting: I can lift only very light weights.  Section 4 - Work: I can hardly do any work at all.  Section 5 - Headaches: I have moderate headaches that come frequently  Section 6 - Concentration: I have a fair degree of difficulty concentrating.  Section 7 - Sleeping: My sleep is moderately disturbed for up to 2-3 hours.  Section 8 - Driving: I can't drive as long as I want because of moderate neck pain.  Section 9 - Reading: I can't read as much as I want because of moderate neck pain.  Section 10 - Recreation: I can hardly do recreational activities due to neck pain.  Neck Disability Index Score: 29      Time Calculation:          PT G-Codes  Outcome Measure Options: Neck Disability Index (NDI)  Neck Disability Index Score: 29         Ivette Betts, PT DPT  1/5/2021   DISPLAY PLAN FREE TEXT

## 2021-07-20 NOTE — OB PST NOTE - SPIRITUAL CULTURAL, RELIGIOUS PRACTICES/VALUES, PROFILE
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  Highly unlikely that he LE swelling and erythematous rash on LE are thyroid origin      Dr.Gladysz JEWELL.    [employer]

## 2021-08-20 ENCOUNTER — APPOINTMENT (OUTPATIENT)
Dept: OBGYN | Facility: CLINIC | Age: 41
End: 2021-08-20
Payer: MEDICAID

## 2021-08-20 VITALS
DIASTOLIC BLOOD PRESSURE: 80 MMHG | WEIGHT: 173.2 LBS | HEIGHT: 67 IN | SYSTOLIC BLOOD PRESSURE: 120 MMHG | BODY MASS INDEX: 27.18 KG/M2

## 2021-08-20 PROCEDURE — 99396 PREV VISIT EST AGE 40-64: CPT

## 2021-08-20 NOTE — REASON FOR VISIT
[Annual] : an annual visit. [Abnormal Uterine Bleeding] : abnormal uterine bleeding [Pelvic Pain] : pelvic pain

## 2021-08-27 LAB — CYTOLOGY CVX/VAG DOC THIN PREP: NORMAL

## 2021-08-31 ENCOUNTER — APPOINTMENT (OUTPATIENT)
Dept: ANTEPARTUM | Facility: CLINIC | Age: 41
End: 2021-08-31
Payer: MEDICAID

## 2021-08-31 ENCOUNTER — ASOB RESULT (OUTPATIENT)
Age: 41
End: 2021-08-31

## 2021-08-31 PROCEDURE — 76856 US EXAM PELVIC COMPLETE: CPT | Mod: 59

## 2021-08-31 PROCEDURE — 76830 TRANSVAGINAL US NON-OB: CPT

## 2021-09-24 ENCOUNTER — APPOINTMENT (OUTPATIENT)
Dept: OBGYN | Facility: CLINIC | Age: 41
End: 2021-09-24
Payer: MEDICAID

## 2021-09-24 VITALS — HEIGHT: 67 IN | DIASTOLIC BLOOD PRESSURE: 88 MMHG | SYSTOLIC BLOOD PRESSURE: 127 MMHG

## 2021-09-24 PROCEDURE — 58558Z: CUSTOM

## 2021-09-24 NOTE — PROCEDURE
[Hysteroscopy] : Hysteroscopy [Time out performed] : Pre-procedure time out performed.  Patient's name, date of birth and procedure confirmed. [Consent Obtained] : Consent obtained [Abnormal uterine bleeding] : abnormal uterine bleeding [Removal of polyp] : removal of polyp [Risks] : risks [Benefits] : benefits [Alternatives] : alternatives [Patient] : patient [Infection] : infection [Bleeding] : bleeding [Allergic Reaction] : allergic reaction [Lidocaine___ mL] : [unfilled] ~UmL of lidocaine [Sent to Pathology] : specimen was placed in buffered formalin and sent for pathology [Antibiotics given] : antibiotics not given [Hemostasis obtained] : hemostasis obtained [Tolerated Well] : Patient tolerated the procedure well [Aftercare instructions/regstrictions given and follow-up scheduled] : Aftercare instructions/restrictions given and follow-up scheduled [de-identified] : cervical polyp [de-identified] : fundus there are condition cervical polypectomy performed and sent to pathology. Paracervical block applied and cervix was dilated and endometrial sampling obtained and sent to pathology. Hysteroscopic evaluation shows normal endometrial contour with lush features. No polyps or fibroids noted. Patient tolerated the procedure well

## 2021-10-05 LAB — CORE LAB BIOPSY: NORMAL

## 2021-11-05 ENCOUNTER — APPOINTMENT (OUTPATIENT)
Dept: OBGYN | Facility: CLINIC | Age: 41
End: 2021-11-05
Payer: MEDICAID

## 2021-11-05 VITALS
WEIGHT: 172.19 LBS | BODY MASS INDEX: 27.02 KG/M2 | HEIGHT: 67 IN | DIASTOLIC BLOOD PRESSURE: 75 MMHG | HEART RATE: 74 BPM | SYSTOLIC BLOOD PRESSURE: 122 MMHG

## 2021-11-05 PROCEDURE — 99213 OFFICE O/P EST LOW 20 MIN: CPT
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
## 2021-12-11 ENCOUNTER — RESULT REVIEW (OUTPATIENT)
Age: 41
End: 2021-12-11

## 2021-12-11 ENCOUNTER — OUTPATIENT (OUTPATIENT)
Dept: OUTPATIENT SERVICES | Facility: HOSPITAL | Age: 41
LOS: 1 days | End: 2021-12-11
Payer: MEDICAID

## 2021-12-11 ENCOUNTER — APPOINTMENT (OUTPATIENT)
Dept: MAMMOGRAPHY | Facility: CLINIC | Age: 41
End: 2021-12-11
Payer: MEDICAID

## 2021-12-11 DIAGNOSIS — Z12.31 ENCOUNTER FOR SCREENING MAMMOGRAM FOR MALIGNANT NEOPLASM OF BREAST: ICD-10-CM

## 2021-12-11 DIAGNOSIS — E89.0 POSTPROCEDURAL HYPOTHYROIDISM: Chronic | ICD-10-CM

## 2021-12-11 DIAGNOSIS — Z98.89 OTHER SPECIFIED POSTPROCEDURAL STATES: Chronic | ICD-10-CM

## 2021-12-11 DIAGNOSIS — Z00.8 ENCOUNTER FOR OTHER GENERAL EXAMINATION: ICD-10-CM

## 2021-12-11 PROCEDURE — 77067 SCR MAMMO BI INCL CAD: CPT

## 2021-12-11 PROCEDURE — 77063 BREAST TOMOSYNTHESIS BI: CPT | Mod: 26

## 2021-12-11 PROCEDURE — 77067 SCR MAMMO BI INCL CAD: CPT | Mod: 26

## 2021-12-11 PROCEDURE — 77063 BREAST TOMOSYNTHESIS BI: CPT

## 2022-08-31 ENCOUNTER — APPOINTMENT (OUTPATIENT)
Dept: OBGYN | Facility: CLINIC | Age: 42
End: 2022-08-31

## 2022-08-31 VITALS
WEIGHT: 177.38 LBS | DIASTOLIC BLOOD PRESSURE: 80 MMHG | HEIGHT: 67 IN | SYSTOLIC BLOOD PRESSURE: 112 MMHG | BODY MASS INDEX: 27.84 KG/M2

## 2022-08-31 DIAGNOSIS — N94.6 DYSMENORRHEA, UNSPECIFIED: ICD-10-CM

## 2022-08-31 DIAGNOSIS — R10.2 PELVIC AND PERINEAL PAIN: ICD-10-CM

## 2022-08-31 PROCEDURE — 99215 OFFICE O/P EST HI 40 MIN: CPT

## 2022-08-31 NOTE — PHYSICAL EXAM
[Chaperone Present] : A chaperone was present in the examining room during all aspects of the physical examination [FreeTextEntry1] : Crystal [Appropriately responsive] : appropriately responsive [Alert] : alert [No Acute Distress] : no acute distress [No Lymphadenopathy] : no lymphadenopathy [Regular Rate Rhythm] : regular rate rhythm [No Murmurs] : no murmurs [Clear to Auscultation B/L] : clear to auscultation bilaterally [Soft] : soft [Non-tender] : non-tender [Non-distended] : non-distended [No HSM] : No HSM [No Lesions] : no lesions [No Mass] : no mass [Oriented x3] : oriented x3 [Examination Of The Breasts] : a normal appearance [No Masses] : no breast masses were palpable [Labia Majora] : normal [Labia Minora] : normal [Normal] : normal [Enlarged ___ wks] : enlarged [unfilled] ~Uweeks [Uterine Adnexae] : normal

## 2022-09-09 LAB — CYTOLOGY CVX/VAG DOC THIN PREP: ABNORMAL

## 2023-01-12 ENCOUNTER — OUTPATIENT (OUTPATIENT)
Dept: OUTPATIENT SERVICES | Facility: HOSPITAL | Age: 43
LOS: 1 days | End: 2023-01-12
Payer: MEDICAID

## 2023-01-12 ENCOUNTER — RESULT REVIEW (OUTPATIENT)
Age: 43
End: 2023-01-12

## 2023-01-12 ENCOUNTER — APPOINTMENT (OUTPATIENT)
Dept: ULTRASOUND IMAGING | Facility: CLINIC | Age: 43
End: 2023-01-12
Payer: MEDICAID

## 2023-01-12 ENCOUNTER — APPOINTMENT (OUTPATIENT)
Dept: MAMMOGRAPHY | Facility: CLINIC | Age: 43
End: 2023-01-12
Payer: MEDICAID

## 2023-01-12 DIAGNOSIS — Z12.39 ENCOUNTER FOR OTHER SCREENING FOR MALIGNANT NEOPLASM OF BREAST: ICD-10-CM

## 2023-01-12 DIAGNOSIS — E89.0 POSTPROCEDURAL HYPOTHYROIDISM: Chronic | ICD-10-CM

## 2023-01-12 DIAGNOSIS — Z00.8 ENCOUNTER FOR OTHER GENERAL EXAMINATION: ICD-10-CM

## 2023-01-12 DIAGNOSIS — Z98.89 OTHER SPECIFIED POSTPROCEDURAL STATES: Chronic | ICD-10-CM

## 2023-01-12 PROCEDURE — 77066 DX MAMMO INCL CAD BI: CPT | Mod: 26

## 2023-01-12 PROCEDURE — 76642 ULTRASOUND BREAST LIMITED: CPT | Mod: 26,50

## 2023-01-12 PROCEDURE — 77062 BREAST TOMOSYNTHESIS BI: CPT | Mod: 26

## 2023-01-12 PROCEDURE — 77066 DX MAMMO INCL CAD BI: CPT

## 2023-01-12 PROCEDURE — G0279: CPT

## 2023-01-12 PROCEDURE — 76642 ULTRASOUND BREAST LIMITED: CPT

## 2023-05-26 ENCOUNTER — APPOINTMENT (OUTPATIENT)
Dept: OBGYN | Facility: CLINIC | Age: 43
End: 2023-05-26
Payer: MEDICAID

## 2023-05-26 VITALS
SYSTOLIC BLOOD PRESSURE: 110 MMHG | BODY MASS INDEX: 26.37 KG/M2 | HEIGHT: 67 IN | DIASTOLIC BLOOD PRESSURE: 77 MMHG | WEIGHT: 168 LBS

## 2023-05-26 PROCEDURE — 99214 OFFICE O/P EST MOD 30 MIN: CPT

## 2023-05-26 RX ORDER — TERCONAZOLE 4 MG/G
0.4 CREAM VAGINAL DAILY
Qty: 1 | Refills: 3 | Status: ACTIVE | COMMUNITY
Start: 2020-01-03 | End: 1900-01-01

## 2023-05-26 NOTE — PHYSICAL EXAM
[Chaperone Present] : A chaperone was present in the examining room during all aspects of the physical examination [FreeTextEntry1] : ronda [Normal] : cervix [No Bleeding] : there was no active vaginal bleeding [Tenderness] : tender [Enlarged ___ wks] : enlarged [unfilled] ~Uweeks [Uterine Adnexae] : were not tender and not enlarged [Adnexa Tenderness On The Right] : was tender to palpation

## 2023-05-26 NOTE — COUNSELING
[Breast Self Exam] : breast self exam [Nutrition] : nutrition [Exercise] : exercise [Vitamins/Supplements] : vitamins/supplements [Dental Care] : dental care [Menstrual Calendar] : menstrual calendar [Bladder Hygiene] : bladder hygiene [Confidentiality] : confidentiality

## 2023-06-02 ENCOUNTER — APPOINTMENT (OUTPATIENT)
Dept: ULTRASOUND IMAGING | Facility: CLINIC | Age: 43
End: 2023-06-02
Payer: MEDICAID

## 2023-06-02 ENCOUNTER — OUTPATIENT (OUTPATIENT)
Dept: OUTPATIENT SERVICES | Facility: HOSPITAL | Age: 43
LOS: 1 days | End: 2023-06-02
Payer: MEDICAID

## 2023-06-02 ENCOUNTER — RESULT REVIEW (OUTPATIENT)
Age: 43
End: 2023-06-02

## 2023-06-02 DIAGNOSIS — R10.2 PELVIC AND PERINEAL PAIN: ICD-10-CM

## 2023-06-02 DIAGNOSIS — Z98.89 OTHER SPECIFIED POSTPROCEDURAL STATES: Chronic | ICD-10-CM

## 2023-06-02 DIAGNOSIS — E89.0 POSTPROCEDURAL HYPOTHYROIDISM: Chronic | ICD-10-CM

## 2023-06-02 PROCEDURE — 76830 TRANSVAGINAL US NON-OB: CPT | Mod: 26

## 2023-06-02 PROCEDURE — 76830 TRANSVAGINAL US NON-OB: CPT

## 2023-06-02 PROCEDURE — 76856 US EXAM PELVIC COMPLETE: CPT | Mod: 26

## 2023-06-02 PROCEDURE — 76856 US EXAM PELVIC COMPLETE: CPT

## 2023-06-23 ENCOUNTER — OFFICE (OUTPATIENT)
Dept: URBAN - METROPOLITAN AREA CLINIC 115 | Facility: CLINIC | Age: 43
Setting detail: OPHTHALMOLOGY
End: 2023-06-23
Payer: MEDICAID

## 2023-06-23 DIAGNOSIS — G43.009: ICD-10-CM

## 2023-06-23 DIAGNOSIS — H04.123: ICD-10-CM

## 2023-06-23 DIAGNOSIS — H43.393: ICD-10-CM

## 2023-06-23 DIAGNOSIS — H40.033: ICD-10-CM

## 2023-06-23 PROBLEM — H52.7 REFRACTIVE ERROR: Status: ACTIVE | Noted: 2023-06-23

## 2023-06-23 PROCEDURE — 92014 COMPRE OPH EXAM EST PT 1/>: CPT | Performed by: OPHTHALMOLOGY

## 2023-06-23 ASSESSMENT — SPHEQUIV_DERIVED
OS_SPHEQUIV: 1.5
OD_SPHEQUIV: 1.5
OD_SPHEQUIV: 0.25

## 2023-06-23 ASSESSMENT — KERATOMETRY
OD_K1POWER_DIOPTERS: 41.00
OS_K1POWER_DIOPTERS: 41.00
OS_AXISANGLE_DEGREES: 123
OS_K2POWER_DIOPTERS: 41.25
OD_AXISANGLE_DEGREES: 093
METHOD_AUTO_MANUAL: AUTO
OD_K2POWER_DIOPTERS: 41.25

## 2023-06-23 ASSESSMENT — TEAR BREAK UP TIME (TBUT)
OD_TBUT: 1+
OS_TBUT: 1+

## 2023-06-23 ASSESSMENT — REFRACTION_MANIFEST
OD_CYLINDER: SPH
OD_SPHERE: +0.50
OS_SPHERE: +0.25
OS_CYLINDER: SPH
OS_SPHERE: +2.75
OD_SPHERE: +2.75
OD_VA1: 20/20
OD_CYLINDER: -0.50
OS_VA1: 20/20
OD_AXIS: 165

## 2023-06-23 ASSESSMENT — REFRACTION_AUTOREFRACTION
OD_CYLINDER: 0.00
OD_SPHERE: +1.50
OS_AXIS: 0.00
OS_CYLINDER: 0.00
OS_SPHERE: +1.50
OD_AXIS: 0.00

## 2023-06-23 ASSESSMENT — VISUAL ACUITY
OD_BCVA: 20/30-1
OS_BCVA: 20/30-1

## 2023-06-23 ASSESSMENT — AXIALLENGTH_DERIVED
OD_AL: 23.8834
OS_AL: 23.8834
OD_AL: 24.3932

## 2023-06-23 ASSESSMENT — CONFRONTATIONAL VISUAL FIELD TEST (CVF)
OS_FINDINGS: FULL
OD_FINDINGS: FULL

## 2023-06-23 ASSESSMENT — TONOMETRY
OS_IOP_MMHG: 16
OD_IOP_MMHG: 13

## 2023-06-26 ENCOUNTER — NON-APPOINTMENT (OUTPATIENT)
Age: 43
End: 2023-06-26

## 2023-06-26 ENCOUNTER — APPOINTMENT (OUTPATIENT)
Dept: OBGYN | Facility: CLINIC | Age: 43
End: 2023-06-26
Payer: MEDICAID

## 2023-06-26 ENCOUNTER — RESULT CHARGE (OUTPATIENT)
Age: 43
End: 2023-06-26

## 2023-06-26 VITALS
SYSTOLIC BLOOD PRESSURE: 131 MMHG | WEIGHT: 179.56 LBS | BODY MASS INDEX: 28.18 KG/M2 | HEIGHT: 67 IN | DIASTOLIC BLOOD PRESSURE: 86 MMHG

## 2023-06-26 DIAGNOSIS — N84.1 POLYP OF CERVIX UTERI: ICD-10-CM

## 2023-06-26 DIAGNOSIS — N92.0 EXCESSIVE AND FREQUENT MENSTRUATION WITH REGULAR CYCLE: ICD-10-CM

## 2023-06-26 PROCEDURE — 58558Z: CUSTOM

## 2023-06-26 PROCEDURE — 81025 URINE PREGNANCY TEST: CPT

## 2023-06-26 NOTE — PROCEDURE
[Hysteroscopy] : Hysteroscopy [Time out performed] : Pre-procedure time out performed.  Patient's name, date of birth and procedure confirmed. [Consent Obtained] : Consent obtained [Abnormal uterine bleeding] : abnormal uterine bleeding [Removal of polyp] : removal of polyp [Risks] : risks [Benefits] : benefits [Alternatives] : alternatives [Patient] : patient [Infection] : infection [Bleeding] : bleeding [Allergic Reaction] : allergic reaction [Lidocaine___ mL] : [unfilled] ~UmL of lidocaine [flexible] : Using aseptic technique a hysteroscopy was performed using a flexible hysteroscope [Sent to Pathology] : specimen was placed in buffered formalin and sent for pathology [Antibiotics given] : antibiotics given [Hemostasis obtained] : hemostasis obtained [Tolerated Well] : Patient tolerated the procedure well [Aftercare instructions/regstrictions given and follow-up scheduled] : Aftercare instructions/restrictions given and follow-up scheduled [de-identified] : Under sterile condition and with paracervical block the endocervical polyps were removed and sent to pathology.  Cervix was dilated and endometrial sampling obtained and sent to pathology.  Hysteroscopic evaluation shows normal endometrial contour with lush features.  No polyps or myomas noted.  Patient tolerated the procedure well.

## 2023-06-27 LAB
HCG UR QL: NEGATIVE
QUALITY CONTROL: YES

## 2023-07-03 LAB — CORE LAB BIOPSY: NORMAL

## 2023-07-24 ENCOUNTER — APPOINTMENT (OUTPATIENT)
Dept: OBGYN | Facility: CLINIC | Age: 43
End: 2023-07-24
Payer: COMMERCIAL

## 2023-07-24 VITALS
BODY MASS INDEX: 27.72 KG/M2 | DIASTOLIC BLOOD PRESSURE: 80 MMHG | SYSTOLIC BLOOD PRESSURE: 110 MMHG | HEIGHT: 67 IN | WEIGHT: 176.6 LBS

## 2023-07-24 PROCEDURE — 99213 OFFICE O/P EST LOW 20 MIN: CPT

## 2023-10-23 ENCOUNTER — OFFICE (OUTPATIENT)
Dept: URBAN - METROPOLITAN AREA CLINIC 115 | Facility: CLINIC | Age: 43
Setting detail: OPHTHALMOLOGY
End: 2023-10-23
Payer: MEDICAID

## 2023-10-23 DIAGNOSIS — H52.7: ICD-10-CM

## 2023-10-23 PROCEDURE — RX/CHECK RX/CHECK: Performed by: OPHTHALMOLOGY

## 2023-10-23 ASSESSMENT — AXIALLENGTH_DERIVED
OD_AL: 23.9334
OD_AL: 24.1356
OS_AL: 23.9837
OD_AL: 24.3932
OS_AL: 24.1867

## 2023-10-23 ASSESSMENT — TEAR BREAK UP TIME (TBUT)
OD_TBUT: 1+
OS_TBUT: 1+

## 2023-10-23 ASSESSMENT — REFRACTION_MANIFEST
OS_VA1: 20/20-2
OD_CYLINDER: -0.25
OD_AXIS: 165
OD_SPHERE: +1.00
OS_SPHERE: +0.25
OS_SPHERE: +1.00
OS_ADD: +1.50
OS_VA1: 20/20
OD_AXIS: 180
OD_CYLINDER: -0.50
OD_ADD: +1.50
OD_SPHERE: +0.50
OD_VA1: 20/20
OS_AXIS: 045
OD_VA1: 20/20
OS_CYLINDER: -0.50

## 2023-10-23 ASSESSMENT — VISUAL ACUITY
OS_BCVA: 20/30-
OD_BCVA: 20/30-2

## 2023-10-23 ASSESSMENT — CONFRONTATIONAL VISUAL FIELD TEST (CVF)
OD_FINDINGS: FULL
OS_FINDINGS: FULL

## 2023-10-23 ASSESSMENT — KERATOMETRY
OS_K1POWER_DIOPTERS: 41.00
OS_AXISANGLE_DEGREES: 123
OD_K2POWER_DIOPTERS: 41.25
METHOD_AUTO_MANUAL: AUTO
OD_AXISANGLE_DEGREES: 093
OD_K1POWER_DIOPTERS: 41.00
OS_K2POWER_DIOPTERS: 41.25

## 2023-10-23 ASSESSMENT — SPHEQUIV_DERIVED
OS_SPHEQUIV: 0.75
OD_SPHEQUIV: 0.25
OS_SPHEQUIV: 1.25
OD_SPHEQUIV: 0.875
OD_SPHEQUIV: 1.375

## 2023-10-23 ASSESSMENT — REFRACTION_AUTOREFRACTION
OS_AXIS: 042
OD_SPHERE: +1.50
OD_CYLINDER: -0.25
OS_CYLINDER: -0.50
OD_AXIS: 140
OS_SPHERE: +1.50

## 2023-10-23 ASSESSMENT — TONOMETRY
OS_IOP_MMHG: 17
OD_IOP_MMHG: 16

## 2024-02-23 ENCOUNTER — APPOINTMENT (OUTPATIENT)
Dept: OBGYN | Facility: CLINIC | Age: 44
End: 2024-02-23
Payer: COMMERCIAL

## 2024-02-23 ENCOUNTER — RESULT REVIEW (OUTPATIENT)
Age: 44
End: 2024-02-23

## 2024-02-23 VITALS
BODY MASS INDEX: 27.81 KG/M2 | SYSTOLIC BLOOD PRESSURE: 122 MMHG | DIASTOLIC BLOOD PRESSURE: 72 MMHG | WEIGHT: 177.2 LBS | HEIGHT: 67 IN

## 2024-02-23 DIAGNOSIS — R10.30 LOWER ABDOMINAL PAIN, UNSPECIFIED: ICD-10-CM

## 2024-02-23 DIAGNOSIS — R10.2 PELVIC AND PERINEAL PAIN: ICD-10-CM

## 2024-02-23 PROCEDURE — 99396 PREV VISIT EST AGE 40-64: CPT

## 2024-02-23 NOTE — COUNSELING
[Nutrition/ Exercise/ Weight Management] : nutrition, exercise, weight management [Body Image] : body image [Vitamins/Supplements] : vitamins/supplements [Sunscreen] : sunscreen [Drugs/Alcohol] : drugs, alcohol [Smoking Cessation] : smoking cessation [Breast Self Exam] : breast self exam [Confidentiality] : confidentiality [Bladder Hygiene] : bladder hygiene

## 2024-02-23 NOTE — DISCUSSION/SUMMARY
[FreeTextEntry1] : 44-year-old G2, P2 presents for routine GYN evaluation and lower abdominal discomfort and constipation.  Physical exam is normal.  Patient has been referred to GI for workup.  Pap GC chlamydia sent and mammogram ordered.  Return in 6 months to a year for follow-up

## 2024-02-23 NOTE — HISTORY OF PRESENT ILLNESS
[FreeTextEntry1] : 44-year-old -0-0-2 last menstrual cycle was 2024 presents with lower abdominal discomfort and constipation.  Patient was referred to GI but she never went.  No abnormal discharge or bleeding.

## 2024-02-23 NOTE — PHYSICAL EXAM
[Chaperone Present] : A chaperone was present in the examining room during all aspects of the physical examination [FreeTextEntry1] : Joy [Appropriately responsive] : appropriately responsive [Alert] : alert [No Acute Distress] : no acute distress [No Lymphadenopathy] : no lymphadenopathy [Regular Rate Rhythm] : regular rate rhythm [No Murmurs] : no murmurs [Clear to Auscultation B/L] : clear to auscultation bilaterally [Soft] : soft [Non-tender] : non-tender [Non-distended] : non-distended [No Lesions] : no lesions [No HSM] : No HSM [No Mass] : no mass [Oriented x3] : oriented x3 [Examination Of The Breasts] : a normal appearance [No Masses] : no breast masses were palpable [Labia Majora] : normal [Labia Minora] : normal [Normal] : normal [Enlarged ___ wks] : enlarged [unfilled] ~Uweeks [Uterine Adnexae] : normal

## 2024-02-28 LAB — CYTOLOGY CVX/VAG DOC THIN PREP: NORMAL

## 2024-03-08 ENCOUNTER — APPOINTMENT (OUTPATIENT)
Dept: MAMMOGRAPHY | Facility: CLINIC | Age: 44
End: 2024-03-08
Payer: MEDICAID

## 2024-03-08 ENCOUNTER — RESULT REVIEW (OUTPATIENT)
Age: 44
End: 2024-03-08

## 2024-03-08 ENCOUNTER — OUTPATIENT (OUTPATIENT)
Dept: OUTPATIENT SERVICES | Facility: HOSPITAL | Age: 44
LOS: 1 days | End: 2024-03-08
Payer: MEDICAID

## 2024-03-08 DIAGNOSIS — Z00.8 ENCOUNTER FOR OTHER GENERAL EXAMINATION: ICD-10-CM

## 2024-03-08 DIAGNOSIS — E89.0 POSTPROCEDURAL HYPOTHYROIDISM: Chronic | ICD-10-CM

## 2024-03-08 DIAGNOSIS — Z98.89 OTHER SPECIFIED POSTPROCEDURAL STATES: Chronic | ICD-10-CM

## 2024-03-08 PROCEDURE — 77066 DX MAMMO INCL CAD BI: CPT

## 2024-03-08 PROCEDURE — 77066 DX MAMMO INCL CAD BI: CPT | Mod: 26

## 2024-03-08 PROCEDURE — 77062 BREAST TOMOSYNTHESIS BI: CPT | Mod: 26

## 2024-03-08 PROCEDURE — G0279: CPT

## 2024-04-19 ENCOUNTER — RX RENEWAL (OUTPATIENT)
Age: 44
End: 2024-04-19

## 2024-05-29 ENCOUNTER — RX RENEWAL (OUTPATIENT)
Age: 44
End: 2024-05-29

## 2024-05-29 RX ORDER — CLOTRIMAZOLE AND BETAMETHASONE DIPROPIONATE 10; .5 MG/G; MG/G
1-0.05 CREAM TOPICAL 3 TIMES DAILY
Qty: 45 | Refills: 2 | Status: ACTIVE | COMMUNITY
Start: 2024-02-23 | End: 1900-01-01

## 2024-12-17 ENCOUNTER — OFFICE (OUTPATIENT)
Dept: URBAN - METROPOLITAN AREA CLINIC 115 | Facility: CLINIC | Age: 44
Setting detail: OPHTHALMOLOGY
End: 2024-12-17
Payer: MEDICAID

## 2024-12-17 DIAGNOSIS — H04.123: ICD-10-CM

## 2024-12-17 DIAGNOSIS — H52.7: ICD-10-CM

## 2024-12-17 DIAGNOSIS — H40.033: ICD-10-CM

## 2024-12-17 DIAGNOSIS — G43.009: ICD-10-CM

## 2024-12-17 DIAGNOSIS — H43.393: ICD-10-CM

## 2024-12-17 PROCEDURE — 92015 DETERMINE REFRACTIVE STATE: CPT | Performed by: OPHTHALMOLOGY

## 2024-12-17 PROCEDURE — 92014 COMPRE OPH EXAM EST PT 1/>: CPT | Performed by: OPHTHALMOLOGY

## 2024-12-17 ASSESSMENT — REFRACTION_MANIFEST
OD_ADD: +1.75
OS_SPHERE: +1.00
OS_VA1: 20/25-
OS_CYLINDER: -0.50
OD_CYLINDER: -0.50
OS_SPHERE: +0.25
OD_SPHERE: +0.50
OD_AXIS: 165
OS_ADD: +1.75
OS_AXIS: 050
OS_VA1: 20/20
OD_VA1: 20/25+
OD_CYLINDER: -0.50
OD_SPHERE: +1.00
OD_AXIS: 180
OD_VA1: 20/20

## 2024-12-17 ASSESSMENT — TEAR BREAK UP TIME (TBUT)
OS_TBUT: 1+
OD_TBUT: 1+

## 2024-12-17 ASSESSMENT — REFRACTION_AUTOREFRACTION
OS_CYLINDER: -0.25
OD_CYLINDER: -0.50
OD_AXIS: 163
OS_SPHERE: +1.50
OS_AXIS: 060
OD_SPHERE: +1.50

## 2024-12-17 ASSESSMENT — KERATOMETRY
METHOD_AUTO_MANUAL: AUTO
OD_K2POWER_DIOPTERS: 41.25
OS_K1POWER_DIOPTERS: 41.00
OS_AXISANGLE_DEGREES: 123
OD_AXISANGLE_DEGREES: 093
OS_K2POWER_DIOPTERS: 41.25
OD_K1POWER_DIOPTERS: 41.00

## 2024-12-17 ASSESSMENT — CONFRONTATIONAL VISUAL FIELD TEST (CVF)
OD_FINDINGS: FULL
OS_FINDINGS: FULL

## 2024-12-17 ASSESSMENT — TONOMETRY
OD_IOP_MMHG: 14
OS_IOP_MMHG: 16

## 2024-12-17 ASSESSMENT — VISUAL ACUITY
OD_BCVA: 20/30-1
OS_BCVA: 20/40

## 2025-01-10 ENCOUNTER — APPOINTMENT (OUTPATIENT)
Dept: OBGYN | Facility: CLINIC | Age: 45
End: 2025-01-10
Payer: MEDICAID

## 2025-01-10 VITALS
WEIGHT: 175 LBS | BODY MASS INDEX: 27.47 KG/M2 | DIASTOLIC BLOOD PRESSURE: 70 MMHG | SYSTOLIC BLOOD PRESSURE: 110 MMHG | HEIGHT: 67 IN

## 2025-01-10 DIAGNOSIS — N64.4 MASTODYNIA: ICD-10-CM

## 2025-01-10 DIAGNOSIS — R10.30 LOWER ABDOMINAL PAIN, UNSPECIFIED: ICD-10-CM

## 2025-01-10 DIAGNOSIS — R10.2 PELVIC AND PERINEAL PAIN: ICD-10-CM

## 2025-01-10 PROCEDURE — 99214 OFFICE O/P EST MOD 30 MIN: CPT | Mod: 25

## 2025-01-10 PROCEDURE — 99459 PELVIC EXAMINATION: CPT

## 2025-02-14 ENCOUNTER — RESULT REVIEW (OUTPATIENT)
Age: 45
End: 2025-02-14

## 2025-02-14 DIAGNOSIS — Z12.39 ENCOUNTER FOR OTHER SCREENING FOR MALIGNANT NEOPLASM OF BREAST: ICD-10-CM

## 2025-02-17 DIAGNOSIS — Z01.419 ENCOUNTER FOR GYNECOLOGICAL EXAMINATION (GENERAL) (ROUTINE) W/OUT ABNORMAL FINDINGS: ICD-10-CM

## 2025-02-19 ENCOUNTER — RESULT REVIEW (OUTPATIENT)
Age: 45
End: 2025-02-19

## 2025-02-19 ENCOUNTER — APPOINTMENT (OUTPATIENT)
Dept: ULTRASOUND IMAGING | Facility: CLINIC | Age: 45
End: 2025-02-19
Payer: MEDICAID

## 2025-02-19 ENCOUNTER — APPOINTMENT (OUTPATIENT)
Dept: MAMMOGRAPHY | Facility: CLINIC | Age: 45
End: 2025-02-19
Payer: MEDICAID

## 2025-02-19 ENCOUNTER — OUTPATIENT (OUTPATIENT)
Dept: OUTPATIENT SERVICES | Facility: HOSPITAL | Age: 45
LOS: 1 days | End: 2025-02-19
Payer: MEDICAID

## 2025-02-19 DIAGNOSIS — N64.4 MASTODYNIA: ICD-10-CM

## 2025-02-19 DIAGNOSIS — E89.0 POSTPROCEDURAL HYPOTHYROIDISM: Chronic | ICD-10-CM

## 2025-02-19 DIAGNOSIS — Z98.89 OTHER SPECIFIED POSTPROCEDURAL STATES: Chronic | ICD-10-CM

## 2025-02-19 DIAGNOSIS — Z00.8 ENCOUNTER FOR OTHER GENERAL EXAMINATION: ICD-10-CM

## 2025-02-19 DIAGNOSIS — R10.2 PELVIC AND PERINEAL PAIN: ICD-10-CM

## 2025-02-19 PROCEDURE — 76642 ULTRASOUND BREAST LIMITED: CPT | Mod: 26,50

## 2025-02-19 PROCEDURE — 77066 DX MAMMO INCL CAD BI: CPT | Mod: 26

## 2025-02-19 PROCEDURE — 77062 BREAST TOMOSYNTHESIS BI: CPT | Mod: 26

## 2025-02-19 PROCEDURE — 76830 TRANSVAGINAL US NON-OB: CPT

## 2025-02-19 PROCEDURE — G0279: CPT

## 2025-02-19 PROCEDURE — 76856 US EXAM PELVIC COMPLETE: CPT | Mod: 26

## 2025-02-19 PROCEDURE — 76830 TRANSVAGINAL US NON-OB: CPT | Mod: 26

## 2025-02-19 PROCEDURE — 76642 ULTRASOUND BREAST LIMITED: CPT

## 2025-02-19 PROCEDURE — 77066 DX MAMMO INCL CAD BI: CPT

## 2025-02-19 PROCEDURE — 76856 US EXAM PELVIC COMPLETE: CPT

## 2025-02-24 DIAGNOSIS — R93.89 ABNORMAL FINDINGS ON DIAGNOSTIC IMAGING OF OTHER SPECIFIED BODY STRUCTURES: ICD-10-CM

## 2025-02-27 ENCOUNTER — APPOINTMENT (OUTPATIENT)
Dept: BREAST CENTER | Facility: CLINIC | Age: 45
End: 2025-02-27
Payer: MEDICAID

## 2025-02-27 DIAGNOSIS — N64.4 MASTODYNIA: ICD-10-CM

## 2025-02-27 PROCEDURE — 99203 OFFICE O/P NEW LOW 30 MIN: CPT

## 2025-03-09 ENCOUNTER — OUTPATIENT (OUTPATIENT)
Dept: OUTPATIENT SERVICES | Facility: HOSPITAL | Age: 45
LOS: 1 days | End: 2025-03-09
Payer: MEDICAID

## 2025-03-09 DIAGNOSIS — E89.0 POSTPROCEDURAL HYPOTHYROIDISM: Chronic | ICD-10-CM

## 2025-03-09 DIAGNOSIS — Z00.8 ENCOUNTER FOR OTHER GENERAL EXAMINATION: ICD-10-CM

## 2025-03-09 DIAGNOSIS — R93.89 ABNORMAL FINDINGS ON DIAGNOSTIC IMAGING OF OTHER SPECIFIED BODY STRUCTURES: ICD-10-CM

## 2025-03-09 DIAGNOSIS — Z98.89 OTHER SPECIFIED POSTPROCEDURAL STATES: Chronic | ICD-10-CM

## 2025-03-09 PROCEDURE — A9585: CPT

## 2025-03-09 PROCEDURE — 72197 MRI PELVIS W/O & W/DYE: CPT

## 2025-05-20 ENCOUNTER — APPOINTMENT (OUTPATIENT)
Dept: OBGYN | Facility: CLINIC | Age: 45
End: 2025-05-20
Payer: MEDICAID

## 2025-05-20 VITALS
WEIGHT: 159 LBS | BODY MASS INDEX: 24.96 KG/M2 | DIASTOLIC BLOOD PRESSURE: 80 MMHG | SYSTOLIC BLOOD PRESSURE: 124 MMHG | HEIGHT: 67 IN

## 2025-05-20 DIAGNOSIS — N92.0 EXCESSIVE AND FREQUENT MENSTRUATION WITH REGULAR CYCLE: ICD-10-CM

## 2025-05-20 DIAGNOSIS — N92.1 EXCESSIVE AND FREQUENT MENSTRUATION WITH IRREGULAR CYCLE: ICD-10-CM

## 2025-05-20 PROCEDURE — 58558Z: CUSTOM

## 2025-05-20 PROCEDURE — 81025 URINE PREGNANCY TEST: CPT

## 2025-05-22 LAB
HCG UR QL: NEGATIVE
QUALITY CONTROL: YES

## 2025-05-23 LAB — CORE LAB BIOPSY: NORMAL

## 2025-06-17 ENCOUNTER — APPOINTMENT (OUTPATIENT)
Dept: OBGYN | Facility: CLINIC | Age: 45
End: 2025-06-17
Payer: MEDICAID

## 2025-06-17 VITALS
SYSTOLIC BLOOD PRESSURE: 112 MMHG | DIASTOLIC BLOOD PRESSURE: 74 MMHG | WEIGHT: 160 LBS | BODY MASS INDEX: 25.11 KG/M2 | HEIGHT: 67 IN

## 2025-06-17 PROCEDURE — 99214 OFFICE O/P EST MOD 30 MIN: CPT
